# Patient Record
Sex: MALE | Race: WHITE | NOT HISPANIC OR LATINO | Employment: OTHER | URBAN - METROPOLITAN AREA
[De-identification: names, ages, dates, MRNs, and addresses within clinical notes are randomized per-mention and may not be internally consistent; named-entity substitution may affect disease eponyms.]

---

## 2018-05-30 ENCOUNTER — OFFICE VISIT CONVERTED (OUTPATIENT)
Dept: FAMILY MEDICINE CLINIC | Facility: CLINIC | Age: 73
End: 2018-05-30
Attending: FAMILY MEDICINE

## 2018-05-30 ENCOUNTER — CONVERSION ENCOUNTER (OUTPATIENT)
Dept: FAMILY MEDICINE CLINIC | Facility: CLINIC | Age: 73
End: 2018-05-30

## 2018-12-03 ENCOUNTER — OFFICE VISIT CONVERTED (OUTPATIENT)
Dept: FAMILY MEDICINE CLINIC | Facility: CLINIC | Age: 73
End: 2018-12-03
Attending: FAMILY MEDICINE

## 2019-05-30 ENCOUNTER — HOSPITAL ENCOUNTER (OUTPATIENT)
Dept: FAMILY MEDICINE CLINIC | Facility: CLINIC | Age: 74
Discharge: HOME | End: 2019-05-30
Attending: FAMILY MEDICINE

## 2019-05-30 LAB
ALBUMIN SERPL-MCNC: 4.4 G/DL (ref 3.5–5)
ALBUMIN/GLOB SERPL: 1.6 {RATIO} (ref 1.4–2.6)
ALP SERPL-CCNC: 41 U/L (ref 56–155)
ALT SERPL-CCNC: 30 U/L (ref 10–40)
ANION GAP SERPL CALC-SCNC: 21 MMOL/L (ref 8–19)
AST SERPL-CCNC: 33 U/L (ref 15–50)
BASOPHILS # BLD AUTO: 0.03 10*3/UL (ref 0–0.2)
BASOPHILS NFR BLD AUTO: 0.5 % (ref 0–3)
BILIRUB SERPL-MCNC: 0.61 MG/DL (ref 0.2–1.3)
BUN SERPL-MCNC: 19 MG/DL (ref 5–25)
BUN/CREAT SERPL: 15 {RATIO} (ref 6–20)
CALCIUM SERPL-MCNC: 9.9 MG/DL (ref 8.7–10.4)
CHLORIDE SERPL-SCNC: 100 MMOL/L (ref 99–111)
CHOLEST SERPL-MCNC: 156 MG/DL (ref 107–200)
CHOLEST/HDLC SERPL: 3.9 {RATIO} (ref 3–6)
CONV ABS IMM GRAN: 0.02 10*3/UL (ref 0–0.2)
CONV CO2: 25 MMOL/L (ref 22–32)
CONV IMMATURE GRAN: 0.3 % (ref 0–1.8)
CONV TOTAL PROTEIN: 7.1 G/DL (ref 6.3–8.2)
CREAT UR-MCNC: 1.27 MG/DL (ref 0.7–1.2)
DEPRECATED RDW RBC AUTO: 41.3 FL (ref 35.1–43.9)
EOSINOPHIL # BLD AUTO: 0.14 10*3/UL (ref 0–0.7)
EOSINOPHIL # BLD AUTO: 2.1 % (ref 0–7)
ERYTHROCYTE [DISTWIDTH] IN BLOOD BY AUTOMATED COUNT: 13 % (ref 11.6–14.4)
EST. AVERAGE GLUCOSE BLD GHB EST-MCNC: 134 MG/DL
GFR SERPLBLD BASED ON 1.73 SQ M-ARVRAT: 56 ML/MIN/{1.73_M2}
GLOBULIN UR ELPH-MCNC: 2.7 G/DL (ref 2–3.5)
GLUCOSE SERPL-MCNC: 116 MG/DL (ref 70–99)
HBA1C MFR BLD: 15 G/DL (ref 14–18)
HBA1C MFR BLD: 6.3 % (ref 3.5–5.7)
HCT VFR BLD AUTO: 44.4 % (ref 42–52)
HDLC SERPL-MCNC: 40 MG/DL (ref 40–60)
LDLC SERPL CALC-MCNC: 75 MG/DL (ref 70–100)
LYMPHOCYTES # BLD AUTO: 1.43 10*3/UL (ref 1–5)
MCH RBC QN AUTO: 29.5 PG (ref 27–31)
MCHC RBC AUTO-ENTMCNC: 33.8 G/DL (ref 33–37)
MCV RBC AUTO: 87.4 FL (ref 80–96)
MONOCYTES # BLD AUTO: 0.44 10*3/UL (ref 0.2–1.2)
MONOCYTES NFR BLD AUTO: 6.6 % (ref 3–10)
NEUTROPHILS # BLD AUTO: 4.58 10*3/UL (ref 2–8)
NEUTROPHILS NFR BLD AUTO: 69 % (ref 30–85)
NRBC CBCN: 0 % (ref 0–0.7)
OSMOLALITY SERPL CALC.SUM OF ELEC: 299 MOSM/KG (ref 273–304)
PLATELET # BLD AUTO: 189 10*3/UL (ref 130–400)
PMV BLD AUTO: 11.5 FL (ref 9.4–12.4)
POTASSIUM SERPL-SCNC: 3.3 MMOL/L (ref 3.5–5.3)
PSA SERPL-MCNC: 0.79 NG/ML (ref 0–4)
RBC # BLD AUTO: 5.08 10*6/UL (ref 4.7–6.1)
SODIUM SERPL-SCNC: 143 MMOL/L (ref 135–147)
TRIGL SERPL-MCNC: 205 MG/DL (ref 40–150)
VARIANT LYMPHS NFR BLD MANUAL: 21.5 % (ref 20–45)
VLDLC SERPL-MCNC: 41 MG/DL (ref 5–37)
WBC # BLD AUTO: 6.64 10*3/UL (ref 4.8–10.8)

## 2019-06-05 ENCOUNTER — OFFICE VISIT CONVERTED (OUTPATIENT)
Dept: FAMILY MEDICINE CLINIC | Facility: CLINIC | Age: 74
End: 2019-06-05
Attending: FAMILY MEDICINE

## 2019-06-05 ENCOUNTER — CONVERSION ENCOUNTER (OUTPATIENT)
Dept: FAMILY MEDICINE CLINIC | Facility: CLINIC | Age: 74
End: 2019-06-05

## 2019-12-02 ENCOUNTER — HOSPITAL ENCOUNTER (OUTPATIENT)
Dept: FAMILY MEDICINE CLINIC | Facility: CLINIC | Age: 74
Discharge: HOME | End: 2019-12-02
Attending: FAMILY MEDICINE

## 2019-12-02 LAB
BASOPHILS # BLD AUTO: 0.06 10*3/UL (ref 0–0.2)
BASOPHILS NFR BLD AUTO: 1 % (ref 0–3)
CONV ABS IMM GRAN: 0.01 10*3/UL (ref 0–0.2)
CONV IMMATURE GRAN: 0.2 % (ref 0–1.8)
DEPRECATED RDW RBC AUTO: 42 FL (ref 35.1–43.9)
EOSINOPHIL # BLD AUTO: 0.16 10*3/UL (ref 0–0.7)
EOSINOPHIL # BLD AUTO: 2.8 % (ref 0–7)
ERYTHROCYTE [DISTWIDTH] IN BLOOD BY AUTOMATED COUNT: 12.8 % (ref 11.6–14.4)
HCT VFR BLD AUTO: 42.3 % (ref 42–52)
HGB BLD-MCNC: 14.3 G/DL (ref 14–18)
LYMPHOCYTES # BLD AUTO: 1.49 10*3/UL (ref 1–5)
LYMPHOCYTES NFR BLD AUTO: 25.8 % (ref 20–45)
MCH RBC QN AUTO: 30.2 PG (ref 27–31)
MCHC RBC AUTO-ENTMCNC: 33.8 G/DL (ref 33–37)
MCV RBC AUTO: 89.4 FL (ref 80–96)
MONOCYTES # BLD AUTO: 0.45 10*3/UL (ref 0.2–1.2)
MONOCYTES NFR BLD AUTO: 7.8 % (ref 3–10)
NEUTROPHILS # BLD AUTO: 3.61 10*3/UL (ref 2–8)
NEUTROPHILS NFR BLD AUTO: 62.4 % (ref 30–85)
NRBC CBCN: 0 % (ref 0–0.7)
PLATELET # BLD AUTO: 184 10*3/UL (ref 130–400)
PMV BLD AUTO: 11.5 FL (ref 9.4–12.4)
RBC # BLD AUTO: 4.73 10*6/UL (ref 4.7–6.1)
WBC # BLD AUTO: 5.78 10*3/UL (ref 4.8–10.8)

## 2019-12-03 LAB
ALBUMIN SERPL-MCNC: 4.1 G/DL (ref 3.5–5)
ALBUMIN/GLOB SERPL: 1.4 {RATIO} (ref 1.4–2.6)
ALP SERPL-CCNC: 35 U/L (ref 56–155)
ALT SERPL-CCNC: 18 U/L (ref 10–40)
ANION GAP SERPL CALC-SCNC: 19 MMOL/L (ref 8–19)
AST SERPL-CCNC: 23 U/L (ref 15–50)
BILIRUB SERPL-MCNC: 0.61 MG/DL (ref 0.2–1.3)
BUN SERPL-MCNC: 22 MG/DL (ref 5–25)
BUN/CREAT SERPL: 15 {RATIO} (ref 6–20)
CALCIUM SERPL-MCNC: 9.6 MG/DL (ref 8.7–10.4)
CHLORIDE SERPL-SCNC: 101 MMOL/L (ref 99–111)
CHOLEST SERPL-MCNC: 145 MG/DL (ref 107–200)
CHOLEST/HDLC SERPL: 4 {RATIO} (ref 3–6)
CONV CO2: 27 MMOL/L (ref 22–32)
CONV TOTAL PROTEIN: 7.1 G/DL (ref 6.3–8.2)
CREAT UR-MCNC: 1.44 MG/DL (ref 0.7–1.2)
GFR SERPLBLD BASED ON 1.73 SQ M-ARVRAT: 48 ML/MIN/{1.73_M2}
GLOBULIN UR ELPH-MCNC: 3 G/DL (ref 2–3.5)
GLUCOSE SERPL-MCNC: 110 MG/DL (ref 70–99)
HDLC SERPL-MCNC: 36 MG/DL (ref 40–60)
LDLC SERPL CALC-MCNC: 70 MG/DL (ref 70–100)
OSMOLALITY SERPL CALC.SUM OF ELEC: 300 MOSM/KG (ref 273–304)
POTASSIUM SERPL-SCNC: 3.8 MMOL/L (ref 3.5–5.3)
SODIUM SERPL-SCNC: 143 MMOL/L (ref 135–147)
TRIGL SERPL-MCNC: 196 MG/DL (ref 40–150)
TSH SERPL-ACNC: 2.45 M[IU]/L (ref 0.27–4.2)
VLDLC SERPL-MCNC: 39 MG/DL (ref 5–37)

## 2019-12-05 ENCOUNTER — OFFICE VISIT CONVERTED (OUTPATIENT)
Dept: FAMILY MEDICINE CLINIC | Facility: CLINIC | Age: 74
End: 2019-12-05
Attending: FAMILY MEDICINE

## 2019-12-05 ENCOUNTER — CONVERSION ENCOUNTER (OUTPATIENT)
Dept: FAMILY MEDICINE CLINIC | Facility: CLINIC | Age: 74
End: 2019-12-05

## 2019-12-05 LAB
EST. AVERAGE GLUCOSE BLD GHB EST-MCNC: 131 MG/DL
HBA1C MFR BLD: 6.2 % (ref 3.5–5.7)

## 2020-06-04 ENCOUNTER — HOSPITAL ENCOUNTER (OUTPATIENT)
Dept: SURGERY | Facility: CLINIC | Age: 75
Discharge: HOME | End: 2020-06-04
Attending: FAMILY MEDICINE

## 2020-06-04 LAB
EST. AVERAGE GLUCOSE BLD GHB EST-MCNC: 146 MG/DL
HBA1C MFR BLD: 6.7 % (ref 3.5–5.7)

## 2020-06-08 ENCOUNTER — OFFICE VISIT CONVERTED (OUTPATIENT)
Dept: FAMILY MEDICINE CLINIC | Facility: CLINIC | Age: 75
End: 2020-06-08
Attending: FAMILY MEDICINE

## 2020-12-10 ENCOUNTER — HOSPITAL ENCOUNTER (OUTPATIENT)
Dept: FAMILY MEDICINE CLINIC | Facility: CLINIC | Age: 75
Discharge: HOME | End: 2020-12-10
Attending: FAMILY MEDICINE

## 2020-12-10 LAB
ALBUMIN SERPL-MCNC: 4.2 G/DL (ref 3.5–5)
ALBUMIN/GLOB SERPL: 1.5 {RATIO} (ref 1.4–2.6)
ALP SERPL-CCNC: 35 U/L (ref 56–155)
ALT SERPL-CCNC: 23 U/L (ref 10–40)
ANION GAP SERPL CALC-SCNC: 15 MMOL/L (ref 8–19)
AST SERPL-CCNC: 25 U/L (ref 15–50)
BASOPHILS # BLD AUTO: 0.03 10*3/UL (ref 0–0.2)
BASOPHILS NFR BLD AUTO: 0.4 % (ref 0–3)
BILIRUB SERPL-MCNC: 0.61 MG/DL (ref 0.2–1.3)
BUN SERPL-MCNC: 21 MG/DL (ref 5–25)
BUN/CREAT SERPL: 16 {RATIO} (ref 6–20)
CALCIUM SERPL-MCNC: 9.6 MG/DL (ref 8.7–10.4)
CHLORIDE SERPL-SCNC: 102 MMOL/L (ref 99–111)
CHOLEST SERPL-MCNC: 150 MG/DL (ref 107–200)
CHOLEST/HDLC SERPL: 3.7 {RATIO} (ref 3–6)
CONV ABS IMM GRAN: 0.02 10*3/UL (ref 0–0.2)
CONV CO2: 28 MMOL/L (ref 22–32)
CONV IMMATURE GRAN: 0.3 % (ref 0–1.8)
CONV TOTAL PROTEIN: 7 G/DL (ref 6.3–8.2)
CREAT UR-MCNC: 1.35 MG/DL (ref 0.7–1.2)
DEPRECATED RDW RBC AUTO: 41.5 FL (ref 35.1–43.9)
EOSINOPHIL # BLD AUTO: 0.21 10*3/UL (ref 0–0.7)
EOSINOPHIL # BLD AUTO: 2.9 % (ref 0–7)
ERYTHROCYTE [DISTWIDTH] IN BLOOD BY AUTOMATED COUNT: 12.9 % (ref 11.6–14.4)
GFR SERPLBLD BASED ON 1.73 SQ M-ARVRAT: 51 ML/MIN/{1.73_M2}
GLOBULIN UR ELPH-MCNC: 2.8 G/DL (ref 2–3.5)
GLUCOSE SERPL-MCNC: 113 MG/DL (ref 70–99)
HCT VFR BLD AUTO: 45.1 % (ref 42–52)
HDLC SERPL-MCNC: 41 MG/DL (ref 40–60)
HGB BLD-MCNC: 15.3 G/DL (ref 14–18)
LDLC SERPL CALC-MCNC: 70 MG/DL (ref 70–100)
LYMPHOCYTES # BLD AUTO: 2.16 10*3/UL (ref 1–5)
LYMPHOCYTES NFR BLD AUTO: 30.3 % (ref 20–45)
MCH RBC QN AUTO: 29.7 PG (ref 27–31)
MCHC RBC AUTO-ENTMCNC: 33.9 G/DL (ref 33–37)
MCV RBC AUTO: 87.4 FL (ref 80–96)
MONOCYTES # BLD AUTO: 0.55 10*3/UL (ref 0.2–1.2)
MONOCYTES NFR BLD AUTO: 7.7 % (ref 3–10)
NEUTROPHILS # BLD AUTO: 4.17 10*3/UL (ref 2–8)
NEUTROPHILS NFR BLD AUTO: 58.4 % (ref 30–85)
NRBC CBCN: 0 % (ref 0–0.7)
OSMOLALITY SERPL CALC.SUM OF ELEC: 298 MOSM/KG (ref 273–304)
PLATELET # BLD AUTO: 200 10*3/UL (ref 130–400)
PMV BLD AUTO: 10.9 FL (ref 9.4–12.4)
POTASSIUM SERPL-SCNC: 3.4 MMOL/L (ref 3.5–5.3)
PSA SERPL-MCNC: 1.01 NG/ML (ref 0–4)
RBC # BLD AUTO: 5.16 10*6/UL (ref 4.7–6.1)
SODIUM SERPL-SCNC: 142 MMOL/L (ref 135–147)
TRIGL SERPL-MCNC: 195 MG/DL (ref 40–150)
TSH SERPL-ACNC: 4 M[IU]/L (ref 0.27–4.2)
VLDLC SERPL-MCNC: 39 MG/DL (ref 5–37)
WBC # BLD AUTO: 7.14 10*3/UL (ref 4.8–10.8)

## 2020-12-14 ENCOUNTER — OFFICE VISIT CONVERTED (OUTPATIENT)
Dept: FAMILY MEDICINE CLINIC | Facility: CLINIC | Age: 75
End: 2020-12-14
Attending: FAMILY MEDICINE

## 2020-12-14 LAB
EST. AVERAGE GLUCOSE BLD GHB EST-MCNC: 140 MG/DL
HBA1C MFR BLD: 6.5 % (ref 3.5–5.7)

## 2021-05-13 NOTE — PROGRESS NOTES
Progress Note      Patient Name: Ben Singh   Patient ID: [id number]   Sex: Male   YOB: 1945    Primary Care Provider: Mustapha Wood III MD    Visit Date: June 8, 2020    Provider: Mustapha Wood III MD   Location: [facility]   Location Address: [address] [place] [address]  [id number]   Location Phone: [phone]          Chief Complaint  · 6 month follow up dm type II, hgb A1C 6.7      History Of Present Illness  Ben Singh is a 75 year old /White male who presents for evaluation and treatment of:      HPI     patient 75-year-old with Diabetes and Hypertension.  Patient is here for his regular 6-month check    Review of systems     cardiovascular no chest pain palpitations  Respiratory no shortness of breath dyspnea on exertion.  No diabetes patient's A1c is 6.7.  GI patient refuses colonoscopy.  Was asked today.      Physical exam     blood pressure 112/70 weight 194, temperature 97.9  General no distress patient appears in vigorous good health  Cardiovascular regular rhythm, no murmur   Respiratory lungs are clear and equal bilaterally no increased work of breathing no wheezes no rales no rhonchi    Assessment     type 2 diabetes doing well.  patient does refuse colonoscopy.      Plan     6-month recheck       Past Medical History  Disease Name Date Onset Notes   Colonoscopy refused 06/08/2020 --    Diabetes mellitus type II, controlled 06/08/2020 --    Diabetes mellitus, type 2 12/03/2018 --    Erectile Dysfunction, Unspecified --  --    Essential hypertension 12/03/2018 --    Hyperlipidemia --  --    Hypertension, essential --  --    Male sexual dysfunction 05/30/2018 --          Past Surgical History  Procedure Name Date Notes   Colonoscopy refused --    Hydrocele Repair --  --          Medication List  Name Date Started Instructions   amlodipine-benazepril 10-20 mg oral capsule 06/08/2020 take 1 capsule by oral route once daily for 90 days    Maxzide-25mg 37.5-25 mg oral tablet 06/08/2020 TAKE 1 TABLET BY ORAL ROUTE ONCE DAILY FOR 90 DAYS   metformin 500 mg oral tablet extended release 24 hr 06/08/2020 TAKE 1 TABLET(500 MG) BY MOUTH EVERY DAY WITH THE EVENING MEAL   simvastatin 20 mg oral tablet 06/08/2020 take 1 tablet (20 mg) by oral route once daily in the evening for 90 days   Viagra 100 mg oral tablet  take 1 tablet (100 mg) by oral route once daily as needed approximately 1 hour before sexual activity         Allergy List  Allergen Name Date Reaction Notes   NO KNOWN DRUG ALLERGIES --  --  --        Allergies Reconciled  Family Medical History  Disease Name Relative/Age Notes   Lung Neoplasm, Malignant Father/   --    Stroke Mother/   --    Heart Disease Father/   --    Diabetes, unspecified type Mother/   --          Social History  Finding Status Start/Stop Quantity Notes   Active but no formal exercise --  --/-- --  --    Advance Care Plan/Surrogate Decision Maker scanned into EMR --  --/-- --  --    Advance directive declined by patient --  --/-- --  --    Alcohol Never --/-- --  05/22/2017 -     --  --/-- --  --    Tobacco Never --/-- --  --          Vitals  Date Time BP Position Site L\R Cuff Size HR RR TEMP (F) WT  HT  BMI kg/m2 BSA m2 O2 Sat HC       06/08/2020 09:13 /70 Sitting      97.9 193lbs 16oz 6'   26.31 2.11               Assessment  · Screening for colon cancer     V76.51/Z12.11  · Hyperlipidemia     272.4/E78.5  · Male sexual dysfunction     302.70/N53.9  · Hypertension, essential     401.9/I10  · Diabetes mellitus type II, controlled     250.00/E11.9  · Medication management     V58.69/Z79.899  · Colonoscopy refused     V64.2/Z53.20    Problems Reconciled  Plan  · Orders  o ACO - Pt declines to or was not able to provide an Advance Care Plan or name a Surrogate Decision Maker (1124F) - - 06/08/2020  o ACO-39: Current medications updated and reviewed () - - 06/08/2020  · Medications  o amlodipine-benazepril  10-20 mg oral capsule   SIG: take 1 capsule by oral route once daily for 90 days   DISP: (90) capsules with 3 refills  Refilled on 06/08/2020     o Maxzide-25mg 37.5-25 mg oral tablet   SIG: TAKE 1 TABLET BY ORAL ROUTE ONCE DAILY FOR 90 DAYS   DISP: (90) Tablet with 3 refills  Refilled on 06/08/2020     o metformin 500 mg oral tablet extended release 24 hr   SIG: TAKE 1 TABLET(500 MG) BY MOUTH EVERY DAY WITH THE EVENING MEAL   DISP: (90) Tablet with 1 refills  Refilled on 06/08/2020     o simvastatin 20 mg oral tablet   SIG: take 1 tablet (20 mg) by oral route once daily in the evening for 90 days   DISP: (90) Tablet with 3 refills  Refilled on 06/08/2020     o Medications have been Reconciled  o Transition of Care or Provider Policy  · Instructions  o Patient declines colorectal cancer screening. Discussed risks associated with not having screening performed.   o Advised that cheeses and other sources of dairy fats, animal fats, fast food, and the extras (candy, pastries, pies, doughnuts and cookies) all contain LDL raising nutrients. Advised to increase fruits, vegetables, whole grains, and to monitor portion sizes.   o Patient is taking medications as prescribed and doing well.   o Take all medications as prescribed/directed.  o Patient instructed/educated on their diet and exercise program.  o Patient was educated/instructed on their diagnosis, treatment and medications prior to discharge from the clinic today.  o Bring all medicines with their bottles to each office visit.  o Time spent with the patient was 30 minutes, more than 50% face to face.  o Chronic conditions reviewed and taken into consideration for today's treatment plan.  o Discussed Covid-19 precautions including, but not limited to, social distancing, avoid touching your face, and hand washing.             Electronically Signed by: Darlyn Meredith, -Author on June 8, 2020 01:07:06 PM  Electronically Co-signed by: Mustapha Wood III MD -Reviewer on  June 8, 2020 05:19:11 PM

## 2021-05-13 NOTE — PROGRESS NOTES
Progress Note      Patient Name: Ben Singh   Patient ID: [id number]   Sex: Male   YOB: 1945    Primary Care Provider: Mustapha Wood III MD   Referring Provider: Mustapha Wood III MD    Visit Date: December 14, 2020    Provider: Mustapha Wood III MD   Location: [facility]   Location Address: [address] [place] [address]  [id number]   Location Phone: [phone]          Chief Complaint  · Adult General Male Physical Exam  · refill meds, fu labs      History Of Present Illness  Ben Singh is a 75 year old /White male who presents for evaluation and treatment of:      PI     patient 75-year-old history of type 2 diabetes hypertension hypercholesterolemia ED here for his annual physical and his 6-month diabetic check feeling well new    Review of systems     cardiovascular no chest pain no palpitations  Respiratory no shortness of breath no dyspnea on exertion  GI patient does not want colonoscopy.  No problems with his GI tract.  Discussed on continuing to lose weight.  Endocrinologic patient takes 1 Metformin we will review his H A1c  Musculoskeletal no pain with his knees hips shoulders it bothers him significantly.  Skin no lesions  Neuro logic no focal deficits no problems with speech clear.  TMs are negative no oral lesions    PE    Blood pressure 110/60 heart rate 68 weight 194 pulse ox 97 temperature 98.2  General no distress  HEENT sclera and conjunctiva clear TMs are negative no oral lesions  Neck no adenopathy no thyroidomegaly no bruits  Respiratory no increased work of breathing lungs clear and equal bilaterally no rales no rhonchi no wheezes  Cardiovascular regular rhythm no murmur  Abdomen soft and nontender no hepatosplenomegaly no abnormal pulsations  Genitalia testicles are normal no hernia penis is normal  Rectal 1+ prostate hypertrophy  Skin no lesions  Neurologic mentation is normal speech is normal gait is  normal  Musculoskeletal few crepitations in his knees good internal and external rotation of his hips shoulders are normal  Adenopathy no cervical axillary or inguinal adenopathy assessment type 2 diabetes hypertension controlled hypercholesterolemia controlled ED on Viagra 100    Assessment  #1continue to lose weight  #2 Hypertension controlled   #3 type 2 diabetes await the A1c    Plan recheck 1 year lose weight       Past Medical History  Disease Name Date Onset Notes   Colonoscopy refused 06/08/2020 --    Diabetes mellitus type II, controlled 06/08/2020 --    Diabetes mellitus, type 2 12/03/2018 --    Erectile Dysfunction, Unspecified --  --    Essential hypertension 12/03/2018 --    Hyperlipidemia --  --    Hypertension, essential --  --    Male sexual dysfunction 05/30/2018 --          Past Surgical History  Procedure Name Date Notes   Colonoscopy refused --    Hydrocele Repair --  --          Medication List  Name Date Started Instructions   amlodipine-benazepril 10-20 mg oral capsule 06/08/2020 take 1 capsule by oral route once daily for 90 days   Maxzide-25mg 37.5-25 mg oral tablet 06/08/2020 TAKE 1 TABLET BY ORAL ROUTE ONCE DAILY FOR 90 DAYS   metformin 500 mg oral tablet extended release 24 hr 12/14/2020 TAKE 1 TABLET(500 MG) BY MOUTH EVERY DAY WITH THE EVENING MEAL for 90 days   simvastatin 20 mg oral tablet 06/08/2020 take 1 tablet (20 mg) by oral route once daily in the evening for 90 days   Viagra 100 mg oral tablet  take 1 tablet (100 mg) by oral route once daily as needed approximately 1 hour before sexual activity         Allergy List  Allergen Name Date Reaction Notes   NO KNOWN DRUG ALLERGIES --  --  --        Allergies Reconciled  Family Medical History  Disease Name Relative/Age Notes   Lung Neoplasm, Malignant Father/   --    Stroke Mother/   --    Heart Disease Father/   --    Diabetes, unspecified type Mother/   --          Social History  Finding Status Start/Stop Quantity Notes   Active  but no formal exercise --  --/-- --  --    Advance Care Plan/Surrogate Decision Maker scanned into EMR --  --/-- --  --    Advance directive declined by patient --  --/-- --  --    Alcohol Never --/-- --  05/22/2017 -     --  --/-- --  --    Tobacco Never --/-- --  --          Vitals  Date Time BP Position Site L\R Cuff Size HR RR TEMP (F) WT  HT  BMI kg/m2 BSA m2 O2 Sat FR L/min FiO2 HC       12/14/2020 03:52 /60 Sitting    68 - R  98.2 193lbs 16oz    97 %  21%              Results  · In-Office Procedures  o Lab procedure  § IOP - Urinalysis without Microscopy (Clinitek) [facility] (21998)   § Color Ur: Yellow   § Clarity Ur: Clear   § Glucose Ur Ql Strip: Negative   § Bilirub Ur Ql Strip: Negative   § Ketones Ur Ql Strip: Negative   § Sp Gr Ur Qn: 1.020   § Hgb Ur Ql Strip: Negative   § pH Ur-LsCnc: 6.5   § Prot Ur Ql Strip: Negative   § Urobilinogen Ur Strip-mCnc: 0.2 E.U./dL   § Nitrite Ur Ql Strip: Negative   § WBC Est Ur Ql Strip: Negative       Assessment  · General Medical Exam, Adult (CPE)     V70.0/Z00.00  · Screening for depression     V79.0/Z13.89  · Colonoscopy refused     V64.2/Z53.20  · Diabetes mellitus type II, controlled     250.00/E11.9  · Male sexual dysfunction     302.70/N53.9  · Hypertension, essential     401.9/I10  · Medication management     V58.69/Z79.899      Plan  · Orders  o ACO-18: Negative screen for clinical depression using a standardized tool () - V79.0/Z13.89 - 12/15/2020  o ACO - Pt declines to or was not able to provide an Advance Care Plan or name a Surrogate Decision Maker (1124F) - - 12/15/2020  o ACO-17: Screened for tobacco use AND received tobacco cessation intervention (4004F) - - 12/15/2020  o ACO-39: Current medications updated and reviewed (0929F, ) - - 12/14/2020  o ACO-18: Negative screen for clinical depression using a standardized tool () - - 12/15/2020  · Medications  o metformin 500 mg oral tablet extended release 24 hr   SIG: TAKE 1  TABLET(500 MG) BY MOUTH EVERY DAY WITH THE EVENING MEAL for 90 days   DISP: (90) Tablet with 1 refills  Refilled on 12/14/2020     o Medications have been Reconciled  o Transition of Care or Provider Policy  · Instructions  o Depression Screen completed and scanned into the EMR under the designated folder within the patient's documents.  o Today's PHQ-9 result is _0__  o The provider screening met the required time of 15 minutes.  o Take all medications as prescribed/directed.  o Patient was educated/instructed on their diagnosis, treatment and medications prior to discharge from the clinic today.  o Call the office with any concerns or questions.  o Bring all medicines with their bottles to each office visit.  o Time spent with the patient was 40 minutes, more than 50% face to face.  o Discussed Covid-19 precautions including, but not limited to, social distancing, avoid touching your face, and hand washing.             Electronically Signed by: Shannan Diaz, -Author on December 15, 2020 06:21:32 PM  Electronically Co-signed by: Mustapha Wood III MD -Reviewer on December 16, 2020 11:46:57 AM

## 2021-05-14 VITALS
WEIGHT: 194 LBS | HEART RATE: 68 BPM | SYSTOLIC BLOOD PRESSURE: 110 MMHG | DIASTOLIC BLOOD PRESSURE: 60 MMHG | OXYGEN SATURATION: 97 % | TEMPERATURE: 98.2 F

## 2021-05-15 VITALS
WEIGHT: 197 LBS | HEIGHT: 72 IN | BODY MASS INDEX: 26.68 KG/M2 | DIASTOLIC BLOOD PRESSURE: 72 MMHG | SYSTOLIC BLOOD PRESSURE: 120 MMHG

## 2021-05-15 VITALS
HEIGHT: 72 IN | DIASTOLIC BLOOD PRESSURE: 70 MMHG | BODY MASS INDEX: 26.14 KG/M2 | SYSTOLIC BLOOD PRESSURE: 120 MMHG | WEIGHT: 193 LBS

## 2021-05-15 VITALS
BODY MASS INDEX: 26.28 KG/M2 | WEIGHT: 194 LBS | TEMPERATURE: 97.9 F | HEIGHT: 72 IN | DIASTOLIC BLOOD PRESSURE: 70 MMHG | SYSTOLIC BLOOD PRESSURE: 112 MMHG

## 2021-05-16 VITALS
HEIGHT: 72 IN | SYSTOLIC BLOOD PRESSURE: 128 MMHG | BODY MASS INDEX: 27.9 KG/M2 | WEIGHT: 206 LBS | DIASTOLIC BLOOD PRESSURE: 70 MMHG

## 2021-05-16 VITALS
SYSTOLIC BLOOD PRESSURE: 120 MMHG | HEIGHT: 72 IN | DIASTOLIC BLOOD PRESSURE: 76 MMHG | WEIGHT: 204 LBS | BODY MASS INDEX: 27.63 KG/M2

## 2021-06-10 ENCOUNTER — LAB (OUTPATIENT)
Dept: FAMILY MEDICINE CLINIC | Facility: CLINIC | Age: 76
End: 2021-06-10

## 2021-06-10 DIAGNOSIS — E11.9 TYPE 2 DIABETES MELLITUS WITHOUT COMPLICATION, WITHOUT LONG-TERM CURRENT USE OF INSULIN (HCC): Primary | ICD-10-CM

## 2021-06-10 LAB — HBA1C MFR BLD: 6.4 % (ref 4.8–5.6)

## 2021-06-10 PROCEDURE — 36415 COLL VENOUS BLD VENIPUNCTURE: CPT | Performed by: FAMILY MEDICINE

## 2021-06-10 PROCEDURE — 83036 HEMOGLOBIN GLYCOSYLATED A1C: CPT | Performed by: FAMILY MEDICINE

## 2021-06-14 PROBLEM — Z79.899 MEDICATION MANAGEMENT: Status: ACTIVE | Noted: 2021-06-14

## 2021-06-14 PROBLEM — E11.9 DIABETES MELLITUS TYPE 2, CONTROLLED: Status: ACTIVE | Noted: 2020-06-08

## 2021-06-14 PROBLEM — Z53.20 COLONOSCOPY REFUSED: Status: ACTIVE | Noted: 2020-06-08

## 2021-06-14 PROBLEM — N53.9 MALE SEXUAL DYSFUNCTION: Status: ACTIVE | Noted: 2018-05-30

## 2021-06-14 RX ORDER — SIMVASTATIN 20 MG
20 TABLET ORAL DAILY
COMMUNITY
Start: 2021-03-21 | End: 2021-06-15 | Stop reason: SDUPTHER

## 2021-06-14 RX ORDER — TRIAMTERENE AND HYDROCHLOROTHIAZIDE 37.5; 25 MG/1; MG/1
1 TABLET ORAL DAILY
COMMUNITY
Start: 2021-03-21 | End: 2021-06-15 | Stop reason: SDUPTHER

## 2021-06-14 RX ORDER — METFORMIN HYDROCHLORIDE 500 MG/1
500 TABLET, EXTENDED RELEASE ORAL DAILY
COMMUNITY
Start: 2021-05-08 | End: 2021-06-15 | Stop reason: SDUPTHER

## 2021-06-14 RX ORDER — AMLODIPINE BESYLATE AND BENAZEPRIL HYDROCHLORIDE 10; 20 MG/1; MG/1
1 CAPSULE ORAL DAILY
COMMUNITY
Start: 2021-03-21 | End: 2021-06-15 | Stop reason: SDUPTHER

## 2021-06-15 ENCOUNTER — OFFICE VISIT (OUTPATIENT)
Dept: FAMILY MEDICINE CLINIC | Facility: CLINIC | Age: 76
End: 2021-06-15

## 2021-06-15 VITALS
WEIGHT: 193 LBS | BODY MASS INDEX: 26.14 KG/M2 | OXYGEN SATURATION: 94 % | SYSTOLIC BLOOD PRESSURE: 110 MMHG | HEIGHT: 72 IN | DIASTOLIC BLOOD PRESSURE: 62 MMHG | TEMPERATURE: 97.1 F | HEART RATE: 57 BPM

## 2021-06-15 DIAGNOSIS — N53.9 MALE SEXUAL DYSFUNCTION: Primary | ICD-10-CM

## 2021-06-15 DIAGNOSIS — E11.9 CONTROLLED TYPE 2 DIABETES MELLITUS WITHOUT COMPLICATION, WITHOUT LONG-TERM CURRENT USE OF INSULIN (HCC): ICD-10-CM

## 2021-06-15 DIAGNOSIS — Z53.20 COLONOSCOPY REFUSED: ICD-10-CM

## 2021-06-15 DIAGNOSIS — I10 ESSENTIAL HYPERTENSION: ICD-10-CM

## 2021-06-15 DIAGNOSIS — Z79.899 MEDICATION MANAGEMENT: ICD-10-CM

## 2021-06-15 LAB
ALBUMIN UR-MCNC: 1.6 MG/DL
CREAT UR-MCNC: 177.5 MG/DL
MICROALBUMIN/CREAT UR: 9 MG/G

## 2021-06-15 PROCEDURE — 82043 UR ALBUMIN QUANTITATIVE: CPT | Performed by: FAMILY MEDICINE

## 2021-06-15 PROCEDURE — 99213 OFFICE O/P EST LOW 20 MIN: CPT | Performed by: FAMILY MEDICINE

## 2021-06-15 PROCEDURE — 82570 ASSAY OF URINE CREATININE: CPT | Performed by: FAMILY MEDICINE

## 2021-06-15 RX ORDER — METFORMIN HYDROCHLORIDE 500 MG/1
500 TABLET, EXTENDED RELEASE ORAL DAILY
Qty: 90 TABLET | Refills: 1 | Status: SHIPPED | OUTPATIENT
Start: 2021-06-15 | End: 2022-02-10

## 2021-06-15 RX ORDER — TRIAMTERENE AND HYDROCHLOROTHIAZIDE 37.5; 25 MG/1; MG/1
1 TABLET ORAL DAILY
Qty: 90 TABLET | Refills: 3 | Status: SHIPPED | OUTPATIENT
Start: 2021-06-15 | End: 2021-06-25

## 2021-06-15 RX ORDER — SIMVASTATIN 20 MG
20 TABLET ORAL DAILY
Qty: 90 TABLET | Refills: 3 | Status: SHIPPED | OUTPATIENT
Start: 2021-06-15 | End: 2022-06-16 | Stop reason: SDUPTHER

## 2021-06-15 RX ORDER — AMLODIPINE BESYLATE AND BENAZEPRIL HYDROCHLORIDE 10; 20 MG/1; MG/1
1 CAPSULE ORAL DAILY
Qty: 90 CAPSULE | Refills: 3 | Status: SHIPPED | OUTPATIENT
Start: 2021-06-15 | End: 2022-06-16 | Stop reason: SDUPTHER

## 2021-06-15 NOTE — PROGRESS NOTES
"Chief Complaint  Diabetes and Med Management    Subjective          Ben Singh presents to [facility]  History of Present Illness  Patient 76 years old history of essential hypertension type 2 diabetes here for his 6-month recheck doing well not having any problems    No Known Allergies     Past Surgical History:   • HYDROCELE EXCISION / REPAIR       Social History     Tobacco Use   • Smoking status: Never Smoker   • Smokeless tobacco: Never Used   Substance Use Topics   • Alcohol use: Never       Family History   Problem Relation Age of Onset   • Stroke Mother    • Diabetes Mother    • Heart disease Father    • Lung disease Father         Health Maintenance Due   Topic Date Due   • URINE MICROALBUMIN  Never done   • COVID-19 Vaccine (1) Never done   • TDAP/TD VACCINES (1 - Tdap) Never done   • ZOSTER VACCINE (1 of 2) Never done   • Pneumococcal Vaccine 65+ (1 of 1 - PPSV23) Never done   • HEPATITIS C SCREENING  Never done   • ANNUAL WELLNESS VISIT  06/09/2021   • DIABETIC FOOT EXAM  Never done      Last Completed Colonoscopy     This patient has no relevant Health Maintenance data.          Review of Systems   Cardiovascular no chest pain no palpitations  Respiratory no shortness of breath no dyspnea on exertion  GI patient refuses colonoscopy also refuses coronavirus vaccine  Objective     Vitals:    06/15/21 1058   BP: 110/62   Pulse: 57   Temp: 97.1 °F (36.2 °C)   SpO2: 94%   Weight: 87.5 kg (193 lb)   Height: 182.9 cm (72\")     Body mass index is 26.18 kg/m².      Physical Exam  General no distress  Cardiovascular regular rhythm no murmur  Respiratory lungs clear and equal bilaterally no rales no rhonchi no wheezes  Skin no lesions  Result Review :              Assessment and Plan    Essential hypertension controlled #2 type 2 diabetes A1c 6.4 good control recheck in 6 months                Patient was given instructions and counseling regarding his condition or for health " maintenance advice. Please see specific information pulled into the AVS if appropriate.

## 2021-06-25 RX ORDER — TRIAMTERENE AND HYDROCHLOROTHIAZIDE 37.5; 25 MG/1; MG/1
TABLET ORAL
Qty: 90 TABLET | Refills: 3 | Status: SHIPPED | OUTPATIENT
Start: 2021-06-25 | End: 2022-06-16 | Stop reason: SDUPTHER

## 2021-12-13 ENCOUNTER — LAB (OUTPATIENT)
Dept: FAMILY MEDICINE CLINIC | Facility: CLINIC | Age: 76
End: 2021-12-13

## 2021-12-13 DIAGNOSIS — E11.9 TYPE 2 DIABETES MELLITUS WITHOUT COMPLICATION, WITHOUT LONG-TERM CURRENT USE OF INSULIN (HCC): ICD-10-CM

## 2021-12-13 DIAGNOSIS — Z11.59 NEED FOR HEPATITIS C SCREENING TEST: ICD-10-CM

## 2021-12-13 DIAGNOSIS — Z79.899 MEDICATION MANAGEMENT: ICD-10-CM

## 2021-12-13 DIAGNOSIS — I10 ESSENTIAL HYPERTENSION: ICD-10-CM

## 2021-12-13 DIAGNOSIS — Z00.00 ROUTINE MEDICAL EXAM: Primary | ICD-10-CM

## 2021-12-13 DIAGNOSIS — E78.2 MIXED HYPERLIPIDEMIA: ICD-10-CM

## 2021-12-13 DIAGNOSIS — Z00.00 ROUTINE MEDICAL EXAM: ICD-10-CM

## 2021-12-13 LAB
ALBUMIN SERPL-MCNC: 3.9 G/DL (ref 3.5–5.2)
ALBUMIN/GLOB SERPL: 1.1 G/DL
ALP SERPL-CCNC: 51 U/L (ref 39–117)
ALT SERPL W P-5'-P-CCNC: 41 U/L (ref 1–41)
ANION GAP SERPL CALCULATED.3IONS-SCNC: 10.7 MMOL/L (ref 5–15)
AST SERPL-CCNC: 40 U/L (ref 1–40)
BASOPHILS # BLD AUTO: 0.05 10*3/MM3 (ref 0–0.2)
BASOPHILS NFR BLD AUTO: 0.8 % (ref 0–1.5)
BILIRUB SERPL-MCNC: 0.6 MG/DL (ref 0–1.2)
BUN SERPL-MCNC: 32 MG/DL (ref 8–23)
BUN/CREAT SERPL: 19.6 (ref 7–25)
CALCIUM SPEC-SCNC: 10.3 MG/DL (ref 8.6–10.5)
CHLORIDE SERPL-SCNC: 103 MMOL/L (ref 98–107)
CHOLEST SERPL-MCNC: 126 MG/DL (ref 0–200)
CO2 SERPL-SCNC: 28.3 MMOL/L (ref 22–29)
CREAT SERPL-MCNC: 1.63 MG/DL (ref 0.76–1.27)
DEPRECATED RDW RBC AUTO: 41.1 FL (ref 37–54)
EOSINOPHIL # BLD AUTO: 0.14 10*3/MM3 (ref 0–0.4)
EOSINOPHIL NFR BLD AUTO: 2.2 % (ref 0.3–6.2)
ERYTHROCYTE [DISTWIDTH] IN BLOOD BY AUTOMATED COUNT: 13 % (ref 12.3–15.4)
GFR SERPL CREATININE-BSD FRML MDRD: 41 ML/MIN/1.73
GLOBULIN UR ELPH-MCNC: 3.5 GM/DL
GLUCOSE SERPL-MCNC: 103 MG/DL (ref 65–99)
HBA1C MFR BLD: 6.93 % (ref 4.8–5.6)
HCT VFR BLD AUTO: 44 % (ref 37.5–51)
HCV AB SER DONR QL: NORMAL
HDLC SERPL-MCNC: 29 MG/DL (ref 40–60)
HGB BLD-MCNC: 14.9 G/DL (ref 13–17.7)
IMM GRANULOCYTES # BLD AUTO: 0.02 10*3/MM3 (ref 0–0.05)
IMM GRANULOCYTES NFR BLD AUTO: 0.3 % (ref 0–0.5)
LDLC SERPL CALC-MCNC: 56 MG/DL (ref 0–100)
LDLC/HDLC SERPL: 1.56 {RATIO}
LYMPHOCYTES # BLD AUTO: 1.42 10*3/MM3 (ref 0.7–3.1)
LYMPHOCYTES NFR BLD AUTO: 22.2 % (ref 19.6–45.3)
MCH RBC QN AUTO: 29.7 PG (ref 26.6–33)
MCHC RBC AUTO-ENTMCNC: 33.9 G/DL (ref 31.5–35.7)
MCV RBC AUTO: 87.8 FL (ref 79–97)
MONOCYTES # BLD AUTO: 0.54 10*3/MM3 (ref 0.1–0.9)
MONOCYTES NFR BLD AUTO: 8.5 % (ref 5–12)
NEUTROPHILS NFR BLD AUTO: 4.22 10*3/MM3 (ref 1.7–7)
NEUTROPHILS NFR BLD AUTO: 66 % (ref 42.7–76)
NRBC BLD AUTO-RTO: 0 /100 WBC (ref 0–0.2)
PLATELET # BLD AUTO: 354 10*3/MM3 (ref 140–450)
PMV BLD AUTO: 10.5 FL (ref 6–12)
POTASSIUM SERPL-SCNC: 4.2 MMOL/L (ref 3.5–5.2)
PROT SERPL-MCNC: 7.4 G/DL (ref 6–8.5)
RBC # BLD AUTO: 5.01 10*6/MM3 (ref 4.14–5.8)
SODIUM SERPL-SCNC: 142 MMOL/L (ref 136–145)
TRIGL SERPL-MCNC: 259 MG/DL (ref 0–150)
TSH SERPL DL<=0.05 MIU/L-ACNC: 1.74 UIU/ML (ref 0.27–4.2)
VLDLC SERPL-MCNC: 41 MG/DL (ref 5–40)
WBC NRBC COR # BLD: 6.39 10*3/MM3 (ref 3.4–10.8)

## 2021-12-13 PROCEDURE — 80061 LIPID PANEL: CPT | Performed by: FAMILY MEDICINE

## 2021-12-13 PROCEDURE — 83036 HEMOGLOBIN GLYCOSYLATED A1C: CPT | Performed by: FAMILY MEDICINE

## 2021-12-13 PROCEDURE — 80053 COMPREHEN METABOLIC PANEL: CPT | Performed by: FAMILY MEDICINE

## 2021-12-13 PROCEDURE — 86803 HEPATITIS C AB TEST: CPT | Performed by: FAMILY MEDICINE

## 2021-12-13 PROCEDURE — 84443 ASSAY THYROID STIM HORMONE: CPT | Performed by: FAMILY MEDICINE

## 2021-12-13 PROCEDURE — 85025 COMPLETE CBC W/AUTO DIFF WBC: CPT | Performed by: FAMILY MEDICINE

## 2021-12-13 PROCEDURE — 36415 COLL VENOUS BLD VENIPUNCTURE: CPT | Performed by: FAMILY MEDICINE

## 2021-12-16 ENCOUNTER — OFFICE VISIT (OUTPATIENT)
Dept: FAMILY MEDICINE CLINIC | Facility: CLINIC | Age: 76
End: 2021-12-16

## 2021-12-16 VITALS
WEIGHT: 182 LBS | HEART RATE: 80 BPM | BODY MASS INDEX: 24.65 KG/M2 | DIASTOLIC BLOOD PRESSURE: 62 MMHG | HEIGHT: 72 IN | TEMPERATURE: 97.3 F | SYSTOLIC BLOOD PRESSURE: 90 MMHG | OXYGEN SATURATION: 94 %

## 2021-12-16 DIAGNOSIS — E78.2 MIXED HYPERLIPIDEMIA: ICD-10-CM

## 2021-12-16 DIAGNOSIS — E11.9 CONTROLLED TYPE 2 DIABETES MELLITUS WITHOUT COMPLICATION, WITHOUT LONG-TERM CURRENT USE OF INSULIN (HCC): ICD-10-CM

## 2021-12-16 DIAGNOSIS — Z00.00 ROUTINE ADULT HEALTH MAINTENANCE: Primary | ICD-10-CM

## 2021-12-16 DIAGNOSIS — Z79.899 MEDICATION MANAGEMENT: ICD-10-CM

## 2021-12-16 DIAGNOSIS — I10 ESSENTIAL HYPERTENSION: ICD-10-CM

## 2021-12-16 LAB
BILIRUB BLD-MCNC: NEGATIVE MG/DL
CLARITY, POC: CLEAR
COLOR UR: YELLOW
EXPIRATION DATE: NORMAL
GLUCOSE UR STRIP-MCNC: NEGATIVE MG/DL
KETONES UR QL: NEGATIVE
LEUKOCYTE EST, POC: NEGATIVE
Lab: NORMAL
NITRITE UR-MCNC: NEGATIVE MG/ML
PH UR: 6 [PH] (ref 5–8)
PROT UR STRIP-MCNC: NEGATIVE MG/DL
RBC # UR STRIP: NEGATIVE /UL
SP GR UR: 1.02 (ref 1–1.03)
UROBILINOGEN UR QL: NORMAL

## 2021-12-16 PROCEDURE — 99213 OFFICE O/P EST LOW 20 MIN: CPT | Performed by: FAMILY MEDICINE

## 2021-12-16 PROCEDURE — 81003 URINALYSIS AUTO W/O SCOPE: CPT | Performed by: FAMILY MEDICINE

## 2021-12-16 NOTE — PROGRESS NOTES
"Chief Complaint  Med Management (yearly routine check up) and Diabetes (6 month follow up)    SUBJECTIVE  Ben Singh presents to [facility]    76-year-old type 2 diabetes hypertension hypercholesterolemia 6-month follow-up for his diabetes    PAST MEDICAL HISTORY  No Known Allergies     Past Surgical History:   • HYDROCELE EXCISION / REPAIR       Social History     Tobacco Use   • Smoking status: Never Smoker   • Smokeless tobacco: Never Used   Substance Use Topics   • Alcohol use: Never       Family History   Problem Relation Age of Onset   • Stroke Mother    • Diabetes Mother    • Heart disease Father    • Lung disease Father         Health Maintenance Due   Topic Date Due   • COVID-19 Vaccine (1) Never done   • Pneumococcal Vaccine 65+ (1 of 2 - PPSV23) Never done   • TDAP/TD VACCINES (1 - Tdap) Never done   • ZOSTER VACCINE (1 of 2) Never done   • ANNUAL WELLNESS VISIT  06/09/2021   • DIABETIC EYE EXAM  11/23/2021        Last Completed Colonoscopy          COLORECTAL CANCER SCREENING (COLONOSCOPY - Every 10 Years) Next due on 6/14/2031 06/14/2021  COLONOSCOPY (Declined - pt refused)                REVIEW OF SYSTEMS    Cardiovascular chest pain no palpitation  Respiratory encourage patient to get both the flu shot and the coronavirus vaccine patient says he may do this no dyspnea on exertion or shortness of breath  GI patient is on colonoscopy    OBJECTIVE  Vitals:    12/16/21 0939   BP: 90/62   Pulse: 80   Temp: 97.3 °F (36.3 °C)   SpO2: 94%   Weight: 82.6 kg (182 lb)   Height: 182.9 cm (72\")     Body mass index is 24.68 kg/m².    PHYSICAL EXAM    General no distress  Cardiovascular regular rhythm no murmur  Respiratory lungs clear bilaterally  Abdomen soft nontender no hepatosplenomegaly    ASSESSMENT & PLAN        Type 2 diabetes needs an A1c hypertension well controlled            Patient was given instructions and counseling regarding his condition or for health " maintenance advice. Please see specific information pulled into the AVS if appropriate.

## 2022-02-10 RX ORDER — METFORMIN HYDROCHLORIDE 500 MG/1
TABLET, EXTENDED RELEASE ORAL
Qty: 90 TABLET | Refills: 1 | Status: SHIPPED | OUTPATIENT
Start: 2022-02-10 | End: 2022-06-16 | Stop reason: SDUPTHER

## 2022-06-13 ENCOUNTER — CLINICAL SUPPORT (OUTPATIENT)
Dept: FAMILY MEDICINE CLINIC | Facility: CLINIC | Age: 77
End: 2022-06-13

## 2022-06-13 DIAGNOSIS — E11.9 CONTROLLED TYPE 2 DIABETES MELLITUS WITHOUT COMPLICATION, WITHOUT LONG-TERM CURRENT USE OF INSULIN: Primary | ICD-10-CM

## 2022-06-13 LAB — HBA1C MFR BLD: 6.7 % (ref 4.8–5.6)

## 2022-06-13 PROCEDURE — 36415 COLL VENOUS BLD VENIPUNCTURE: CPT | Performed by: FAMILY MEDICINE

## 2022-06-13 PROCEDURE — 83036 HEMOGLOBIN GLYCOSYLATED A1C: CPT | Performed by: FAMILY MEDICINE

## 2022-06-16 ENCOUNTER — OFFICE VISIT (OUTPATIENT)
Dept: FAMILY MEDICINE CLINIC | Facility: CLINIC | Age: 77
End: 2022-06-16

## 2022-06-16 VITALS
WEIGHT: 189 LBS | DIASTOLIC BLOOD PRESSURE: 68 MMHG | TEMPERATURE: 97.9 F | OXYGEN SATURATION: 98 % | SYSTOLIC BLOOD PRESSURE: 130 MMHG | BODY MASS INDEX: 25.6 KG/M2 | HEIGHT: 72 IN | HEART RATE: 77 BPM

## 2022-06-16 DIAGNOSIS — E11.9 CONTROLLED TYPE 2 DIABETES MELLITUS WITHOUT COMPLICATION, WITHOUT LONG-TERM CURRENT USE OF INSULIN: Primary | ICD-10-CM

## 2022-06-16 DIAGNOSIS — Z79.899 MEDICATION MANAGEMENT: ICD-10-CM

## 2022-06-16 DIAGNOSIS — E78.2 MIXED HYPERLIPIDEMIA: ICD-10-CM

## 2022-06-16 DIAGNOSIS — I10 ESSENTIAL HYPERTENSION: ICD-10-CM

## 2022-06-16 LAB
ALBUMIN UR-MCNC: <1.2 MG/DL
CREAT UR-MCNC: 99.7 MG/DL
MICROALBUMIN/CREAT UR: NORMAL MG/G{CREAT}

## 2022-06-16 PROCEDURE — 99213 OFFICE O/P EST LOW 20 MIN: CPT | Performed by: FAMILY MEDICINE

## 2022-06-16 PROCEDURE — 82043 UR ALBUMIN QUANTITATIVE: CPT | Performed by: FAMILY MEDICINE

## 2022-06-16 PROCEDURE — 82570 ASSAY OF URINE CREATININE: CPT | Performed by: FAMILY MEDICINE

## 2022-06-16 RX ORDER — SIMVASTATIN 20 MG
20 TABLET ORAL DAILY
Qty: 90 TABLET | Refills: 3 | Status: SHIPPED | OUTPATIENT
Start: 2022-06-16

## 2022-06-16 RX ORDER — AMLODIPINE BESYLATE AND BENAZEPRIL HYDROCHLORIDE 10; 20 MG/1; MG/1
1 CAPSULE ORAL DAILY
Qty: 90 CAPSULE | Refills: 3 | Status: SHIPPED | OUTPATIENT
Start: 2022-06-16

## 2022-06-16 RX ORDER — METFORMIN HYDROCHLORIDE 500 MG/1
500 TABLET, EXTENDED RELEASE ORAL DAILY
Qty: 90 TABLET | Refills: 1 | Status: SHIPPED | OUTPATIENT
Start: 2022-06-16 | End: 2023-01-25 | Stop reason: SDUPTHER

## 2022-06-16 RX ORDER — TRIAMTERENE AND HYDROCHLOROTHIAZIDE 37.5; 25 MG/1; MG/1
1 TABLET ORAL DAILY
Qty: 90 TABLET | Refills: 3 | Status: SHIPPED | OUTPATIENT
Start: 2022-06-16

## 2022-06-16 NOTE — PROGRESS NOTES
"Chief Complaint  Diabetes (6 month follow up and a1c check)    SUBJECTIVE  Bne Singh presents to [facility]    Patient is a 77 years old type 2 diabetesHypertension for 6-month checkup his eyes checked regularly no problems with his feet    PAST MEDICAL HISTORY  No Known Allergies     Past Surgical History:   • HYDROCELE EXCISION / REPAIR       Social History     Tobacco Use   • Smoking status: Never Smoker   • Smokeless tobacco: Never Used   Substance Use Topics   • Alcohol use: Never       Family History   Problem Relation Age of Onset   • Stroke Mother    • Diabetes Mother    • Heart disease Father    • Lung disease Father         Health Maintenance Due   Topic Date Due   • Pneumococcal Vaccine 65+ (1 - PCV) Never done   • TDAP/TD VACCINES (1 - Tdap) Never done   • ZOSTER VACCINE (1 of 2) Never done   • ANNUAL WELLNESS VISIT  06/09/2021   • URINE MICROALBUMIN  06/15/2022        Last Completed Colonoscopy     This patient has no relevant Health Maintenance data.          REVIEW OF SYSTEMS    Cardiovascular no chest pain no palpitations  Respiratory no shortness of breath dyspnea on exertion  Endo A1c done yesterday was 6.7 good control    OBJECTIVE  Vitals:    06/16/22 1008   BP: 130/68   Pulse: 77   Temp: 97.9 °F (36.6 °C)   SpO2: 98%   Weight: 85.7 kg (189 lb)   Height: 182.9 cm (72\")     Body mass index is 25.63 kg/m².    PHYSICAL EXAM    General no distress  Cardiovascular regular rhythm no murmur  Respiratory lungs clear and equal bilaterally    ASSESSMENT & PLAN  Diagnoses and all orders for this visit:    1. Controlled type 2 diabetes mellitus without complication, without long-term current use of insulin (HCC) (Primary)  -     Microalbumin / Creatinine Urine Ratio - Urine, Clean Catch  -     metFORMIN ER (GLUCOPHAGE-XR) 500 MG 24 hr tablet; Take 1 tablet by mouth Daily.  Dispense: 90 tablet; Refill: 1    2. Medication management  -     Microalbumin / Creatinine Urine " Ratio - Urine, Clean Catch    3. Essential hypertension  -     amLODIPine-benazepril (LOTREL) 10-20 MG per capsule; Take 1 capsule by mouth Daily.  Dispense: 90 capsule; Refill: 3  -     triamterene-hydrochlorothiazide (MAXZIDE-25) 37.5-25 MG per tablet; Take 1 tablet by mouth Daily.  Dispense: 90 tablet; Refill: 3    4. Mixed hyperlipidemia  -     simvastatin (ZOCOR) 20 MG tablet; Take 1 tablet by mouth Daily.  Dispense: 90 tablet; Refill: 3          Type 2 diabetes good control A1c 6.7 hypertension good control continue Maxide and Lotrel            Patient was given instructions and counseling regarding his condition or for health maintenance advice. Please see specific information pulled into the AVS if appropriate.

## 2023-01-18 ENCOUNTER — TELEPHONE (OUTPATIENT)
Dept: FAMILY MEDICINE CLINIC | Facility: CLINIC | Age: 78
End: 2023-01-18
Payer: MEDICARE

## 2023-01-18 DIAGNOSIS — I10 ESSENTIAL HYPERTENSION: ICD-10-CM

## 2023-01-18 DIAGNOSIS — Z79.899 MEDICATION MANAGEMENT: ICD-10-CM

## 2023-01-18 DIAGNOSIS — E11.9 TYPE 2 DIABETES MELLITUS WITHOUT COMPLICATION, WITHOUT LONG-TERM CURRENT USE OF INSULIN: ICD-10-CM

## 2023-01-18 DIAGNOSIS — E78.2 MIXED HYPERLIPIDEMIA: ICD-10-CM

## 2023-01-18 DIAGNOSIS — Z00.00 LABORATORY EXAM ORDERED AS PART OF ROUTINE GENERAL MEDICAL EXAMINATION: Primary | ICD-10-CM

## 2023-01-18 NOTE — TELEPHONE ENCOUNTER
Caller: Ben Singh    Relationship: Self    Best call back number: [phone]    What orders are you requesting (i.e. lab or imaging): LABS    In what timeframe would the patient need to come in: AS SOON AS POSSIBLE, BUT BEFORE HIS APPOINTMENT ON 1.25.23. PATIENT WOULD LIKE TO COME IN ON 1.23.23.    Where will you receive your lab/imaging services: [facility]

## 2023-01-23 ENCOUNTER — CLINICAL SUPPORT (OUTPATIENT)
Dept: FAMILY MEDICINE CLINIC | Facility: CLINIC | Age: 78
End: 2023-01-23
Payer: MEDICARE

## 2023-01-23 DIAGNOSIS — I10 ESSENTIAL HYPERTENSION: ICD-10-CM

## 2023-01-23 DIAGNOSIS — Z79.899 MEDICATION MANAGEMENT: ICD-10-CM

## 2023-01-23 DIAGNOSIS — E78.2 MIXED HYPERLIPIDEMIA: ICD-10-CM

## 2023-01-23 DIAGNOSIS — E11.9 TYPE 2 DIABETES MELLITUS WITHOUT COMPLICATION, WITHOUT LONG-TERM CURRENT USE OF INSULIN: ICD-10-CM

## 2023-01-23 DIAGNOSIS — Z00.00 LABORATORY EXAM ORDERED AS PART OF ROUTINE GENERAL MEDICAL EXAMINATION: ICD-10-CM

## 2023-01-23 LAB
ALBUMIN SERPL-MCNC: 4 G/DL (ref 3.5–5.2)
ALBUMIN/GLOB SERPL: 1.4 G/DL
ALP SERPL-CCNC: 38 U/L (ref 39–117)
ALT SERPL W P-5'-P-CCNC: 19 U/L (ref 1–41)
ANION GAP SERPL CALCULATED.3IONS-SCNC: 9 MMOL/L (ref 5–15)
AST SERPL-CCNC: 18 U/L (ref 1–40)
BASOPHILS # BLD AUTO: 0.05 10*3/MM3 (ref 0–0.2)
BASOPHILS NFR BLD AUTO: 0.7 % (ref 0–1.5)
BILIRUB SERPL-MCNC: 0.6 MG/DL (ref 0–1.2)
BUN SERPL-MCNC: 19 MG/DL (ref 8–23)
BUN/CREAT SERPL: 13 (ref 7–25)
CALCIUM SPEC-SCNC: 9.7 MG/DL (ref 8.6–10.5)
CHLORIDE SERPL-SCNC: 103 MMOL/L (ref 98–107)
CHOLEST SERPL-MCNC: 143 MG/DL (ref 0–200)
CO2 SERPL-SCNC: 31 MMOL/L (ref 22–29)
CREAT SERPL-MCNC: 1.46 MG/DL (ref 0.76–1.27)
DEPRECATED RDW RBC AUTO: 41.5 FL (ref 37–54)
EGFRCR SERPLBLD CKD-EPI 2021: 49.5 ML/MIN/1.73
EOSINOPHIL # BLD AUTO: 0.26 10*3/MM3 (ref 0–0.4)
EOSINOPHIL NFR BLD AUTO: 3.4 % (ref 0.3–6.2)
ERYTHROCYTE [DISTWIDTH] IN BLOOD BY AUTOMATED COUNT: 12.9 % (ref 12.3–15.4)
GLOBULIN UR ELPH-MCNC: 2.9 GM/DL
GLUCOSE SERPL-MCNC: 121 MG/DL (ref 65–99)
HBA1C MFR BLD: 6.5 % (ref 4.8–5.6)
HCT VFR BLD AUTO: 42.3 % (ref 37.5–51)
HDLC SERPL-MCNC: 39 MG/DL (ref 40–60)
HGB BLD-MCNC: 14.5 G/DL (ref 13–17.7)
IMM GRANULOCYTES # BLD AUTO: 0.02 10*3/MM3 (ref 0–0.05)
IMM GRANULOCYTES NFR BLD AUTO: 0.3 % (ref 0–0.5)
LDLC SERPL CALC-MCNC: 73 MG/DL (ref 0–100)
LDLC/HDLC SERPL: 1.71 {RATIO}
LYMPHOCYTES # BLD AUTO: 2.14 10*3/MM3 (ref 0.7–3.1)
LYMPHOCYTES NFR BLD AUTO: 28.1 % (ref 19.6–45.3)
MCH RBC QN AUTO: 30.3 PG (ref 26.6–33)
MCHC RBC AUTO-ENTMCNC: 34.3 G/DL (ref 31.5–35.7)
MCV RBC AUTO: 88.5 FL (ref 79–97)
MONOCYTES # BLD AUTO: 0.52 10*3/MM3 (ref 0.1–0.9)
MONOCYTES NFR BLD AUTO: 6.8 % (ref 5–12)
NEUTROPHILS NFR BLD AUTO: 4.62 10*3/MM3 (ref 1.7–7)
NEUTROPHILS NFR BLD AUTO: 60.7 % (ref 42.7–76)
NRBC BLD AUTO-RTO: 0 /100 WBC (ref 0–0.2)
PLATELET # BLD AUTO: 190 10*3/MM3 (ref 140–450)
PMV BLD AUTO: 11.5 FL (ref 6–12)
POTASSIUM SERPL-SCNC: 3.7 MMOL/L (ref 3.5–5.2)
PROT SERPL-MCNC: 6.9 G/DL (ref 6–8.5)
RBC # BLD AUTO: 4.78 10*6/MM3 (ref 4.14–5.8)
SODIUM SERPL-SCNC: 143 MMOL/L (ref 136–145)
TRIGL SERPL-MCNC: 186 MG/DL (ref 0–150)
TSH SERPL DL<=0.05 MIU/L-ACNC: 4.08 UIU/ML (ref 0.27–4.2)
VLDLC SERPL-MCNC: 31 MG/DL (ref 5–40)
WBC NRBC COR # BLD: 7.61 10*3/MM3 (ref 3.4–10.8)

## 2023-01-23 PROCEDURE — 36415 COLL VENOUS BLD VENIPUNCTURE: CPT | Performed by: FAMILY MEDICINE

## 2023-01-23 PROCEDURE — 84443 ASSAY THYROID STIM HORMONE: CPT | Performed by: FAMILY MEDICINE

## 2023-01-23 PROCEDURE — 80053 COMPREHEN METABOLIC PANEL: CPT | Performed by: FAMILY MEDICINE

## 2023-01-23 PROCEDURE — 83036 HEMOGLOBIN GLYCOSYLATED A1C: CPT | Performed by: FAMILY MEDICINE

## 2023-01-23 PROCEDURE — 85025 COMPLETE CBC W/AUTO DIFF WBC: CPT | Performed by: FAMILY MEDICINE

## 2023-01-23 PROCEDURE — 84550 ASSAY OF BLOOD/URIC ACID: CPT | Performed by: FAMILY MEDICINE

## 2023-01-23 PROCEDURE — 80061 LIPID PANEL: CPT | Performed by: FAMILY MEDICINE

## 2023-01-25 ENCOUNTER — OFFICE VISIT (OUTPATIENT)
Dept: FAMILY MEDICINE CLINIC | Facility: CLINIC | Age: 78
End: 2023-01-25
Payer: MEDICARE

## 2023-01-25 VITALS
TEMPERATURE: 97.1 F | HEART RATE: 63 BPM | DIASTOLIC BLOOD PRESSURE: 62 MMHG | BODY MASS INDEX: 25.47 KG/M2 | HEIGHT: 72 IN | SYSTOLIC BLOOD PRESSURE: 132 MMHG | OXYGEN SATURATION: 95 % | WEIGHT: 188 LBS

## 2023-01-25 DIAGNOSIS — E11.9 CONTROLLED TYPE 2 DIABETES MELLITUS WITHOUT COMPLICATION, WITHOUT LONG-TERM CURRENT USE OF INSULIN: ICD-10-CM

## 2023-01-25 DIAGNOSIS — N53.9 MALE SEXUAL DYSFUNCTION: ICD-10-CM

## 2023-01-25 DIAGNOSIS — Z00.00 MEDICARE ANNUAL WELLNESS VISIT, SUBSEQUENT: Primary | ICD-10-CM

## 2023-01-25 DIAGNOSIS — E78.2 MIXED HYPERLIPIDEMIA: ICD-10-CM

## 2023-01-25 DIAGNOSIS — M67.442 GANGLION CYST OF JOINT OF FINGER OF LEFT HAND: ICD-10-CM

## 2023-01-25 DIAGNOSIS — M10.9 GOUT OF LEFT HAND, UNSPECIFIED CAUSE, UNSPECIFIED CHRONICITY: ICD-10-CM

## 2023-01-25 DIAGNOSIS — Z79.899 MEDICATION MANAGEMENT: ICD-10-CM

## 2023-01-25 DIAGNOSIS — I10 ESSENTIAL HYPERTENSION: ICD-10-CM

## 2023-01-25 DIAGNOSIS — M25.442 SWELLING OF FINGER JOINT OF LEFT HAND: ICD-10-CM

## 2023-01-25 DIAGNOSIS — Z12.11 SCREENING FOR COLON CANCER: ICD-10-CM

## 2023-01-25 DIAGNOSIS — Z53.20 COLONOSCOPY REFUSED: ICD-10-CM

## 2023-01-25 LAB — URATE SERPL-MCNC: 7.6 MG/DL (ref 3.4–7)

## 2023-01-25 PROCEDURE — 1170F FXNL STATUS ASSESSED: CPT | Performed by: FAMILY MEDICINE

## 2023-01-25 PROCEDURE — G0439 PPPS, SUBSEQ VISIT: HCPCS | Performed by: FAMILY MEDICINE

## 2023-01-25 PROCEDURE — 20600 DRAIN/INJ JOINT/BURSA W/O US: CPT | Performed by: FAMILY MEDICINE

## 2023-01-25 PROCEDURE — 82274 ASSAY TEST FOR BLOOD FECAL: CPT | Performed by: FAMILY MEDICINE

## 2023-01-25 PROCEDURE — 1159F MED LIST DOCD IN RCRD: CPT | Performed by: FAMILY MEDICINE

## 2023-01-25 RX ORDER — METFORMIN HYDROCHLORIDE 500 MG/1
500 TABLET, EXTENDED RELEASE ORAL DAILY
Qty: 90 TABLET | Refills: 1 | Status: SHIPPED | OUTPATIENT
Start: 2023-01-25

## 2023-01-25 RX ORDER — ALLOPURINOL 100 MG/1
TABLET ORAL
Qty: 90 TABLET | Refills: 0 | Status: SHIPPED | OUTPATIENT
Start: 2023-01-25 | End: 2023-03-26

## 2023-01-25 RX ORDER — ALLOPURINOL 300 MG/1
300 TABLET ORAL DAILY
Qty: 90 TABLET | Refills: 3 | Status: SHIPPED | OUTPATIENT
Start: 2023-01-25

## 2023-01-25 RX ADMIN — METHYLPREDNISOLONE ACETATE 10 MG: 40 INJECTION, SUSPENSION INTRA-ARTICULAR; INTRALESIONAL; INTRAMUSCULAR; SOFT TISSUE at 11:29

## 2023-01-25 NOTE — PROGRESS NOTES
The ABCs of the Annual Wellness Visit  Initial Medicare Wellness Visit    Subjective   History of Present Illness:  Ben Singh is a 77 y.o. male who presents for an Initial Medicare Wellness Visit.    The following portions of the patient's history were reviewed and   updated as appropriate:   He  has a past medical history of Colonoscopy refused (06/08/2020), Diabetes mellitus type 2, controlled (HCC) (06/08/2020), Diabetes mellitus, type 2 (HCC) (12/03/2018), Essential hypertension, Male erectile dysfunction, unspecified, and Male sexual dysfunction (05/30/2018).  He does not have any pertinent problems on file.  He  has a past surgical history that includes Hydrocele excision / repair.  His family history includes Diabetes in his mother; Heart disease in his father; Lung disease in his father; Stroke in his mother.  He  reports that he has never smoked. He has never used smokeless tobacco. He reports that he does not drink alcohol and does not use drugs.  Current Outpatient Medications   Medication Sig Dispense Refill   • amLODIPine-benazepril (LOTREL) 10-20 MG per capsule Take 1 capsule by mouth Daily. 90 capsule 3   • metFORMIN ER (GLUCOPHAGE-XR) 500 MG 24 hr tablet Take 1 tablet by mouth Daily. 90 tablet 1   • simvastatin (ZOCOR) 20 MG tablet Take 1 tablet by mouth Daily. 90 tablet 3   • triamterene-hydrochlorothiazide (MAXZIDE-25) 37.5-25 MG per tablet Take 1 tablet by mouth Daily. 90 tablet 3   • allopurinol (ZYLOPRIM) 100 MG tablet Take 1 tablet by mouth Daily for 30 days, THEN 2 tablets Daily for 30 days. 90 tablet 0   • allopurinol (ZYLOPRIM) 300 MG tablet Take 1 tablet by mouth Daily. Start after completing 200 mg for 1 month 90 tablet 3     No current facility-administered medications for this visit.     Current Outpatient Medications on File Prior to Visit   Medication Sig   • amLODIPine-benazepril (LOTREL) 10-20 MG per capsule Take 1 capsule by mouth Daily.   • simvastatin (ZOCOR) 20 MG tablet  Take 1 tablet by mouth Daily.   • triamterene-hydrochlorothiazide (MAXZIDE-25) 37.5-25 MG per tablet Take 1 tablet by mouth Daily.     No current facility-administered medications on file prior to visit.     He has No Known Allergies..     Compared to one year ago, the patient feels his physical   health is the same.    Compared to one year ago, the patient feels his mental   health is the same.    Recent Hospitalizations:  He was not admitted to the hospital during the last year.       Current Medical Providers:  Patient Care Team:  Mustapha Wood III, MD as PCP - General (Family Medicine)    Outpatient Medications Prior to Visit   Medication Sig Dispense Refill   • amLODIPine-benazepril (LOTREL) 10-20 MG per capsule Take 1 capsule by mouth Daily. 90 capsule 3   • simvastatin (ZOCOR) 20 MG tablet Take 1 tablet by mouth Daily. 90 tablet 3   • triamterene-hydrochlorothiazide (MAXZIDE-25) 37.5-25 MG per tablet Take 1 tablet by mouth Daily. 90 tablet 3   • metFORMIN ER (GLUCOPHAGE-XR) 500 MG 24 hr tablet Take 1 tablet by mouth Daily. 90 tablet 1     No facility-administered medications prior to visit.       No opioid medication identified on active medication list. I have reviewed chart for other potential  high risk medication/s and harmful drug interactions in the elderly.          Aspirin is not on active medication list.  Aspirin use is not indicated based on review of current medical condition/s. Risk of harm outweighs potential benefits.  .    Patient Active Problem List   Diagnosis   • Male sexual dysfunction   • Essential hypertension   • Diabetes mellitus type 2, controlled (HCC)   • Colonoscopy refused   • Medication management   • Mixed hyperlipidemia     Advance Care Planning  Advance Directive is not on file.  ACP discussion was held with the patient during this visit. Patient does not have an advance directive, information provided.    REVIEW OF SYSTEMS  Lungs no shortness of breath dyspnea  "exertion  Cardiovascular no chest pain no palpitations patient exerts himself  GI patient politely declines colonoscopy did do Hemoccult this time and it was negative    Objective       Vitals:    01/25/23 1030   BP: 132/62   Pulse: 63   Temp: 97.1 °F (36.2 °C)   SpO2: 95%   Weight: 85.3 kg (188 lb)   Height: 182.9 cm (72\")     BMI Readings from Last 1 Encounters:   01/25/23 25.50 kg/m²   BMI is above normal parameters. Recommendations include: educational material    Does the patient have evidence of cognitive impairment? No    PHYSICAL EXAM  General no distress  Lungs clear and equal bilaterally  Cardiovascular regular rhythm no murmur  Abdomen soft nontender no hepatosplenomegaly  Rectal 1+ prostate hypertrophy no nodules no masses Hemoccult was done and is negative here in the office  Musculoskeletal left long finger DIP ganglion cyst    Procedure after careful chlorhexidine prep patient had 5 mg of Depo-Medrol and half cc of Xylocaine injected into the ganglion cyst left long finger DIP  Lab Results   Component Value Date    TRIG 186 (H) 01/23/2023    HDL 39 (L) 01/23/2023    LDL 73 01/23/2023    VLDL 31 01/23/2023    HGBA1C 6.50 (H) 01/23/2023         HEALTH RISK ASSESSMENT    Smoking Status:  Social History     Tobacco Use   Smoking Status Never   Smokeless Tobacco Never     Alcohol Consumption:  Social History     Substance and Sexual Activity   Alcohol Use Never     Fall Risk Screen:    FRANK Fall Risk Assessment was completed, and patient is at LOW risk for falls.Assessment completed on:1/25/2023    Depression Screen:   PHQ-2/PHQ-9 Depression Screening 1/25/2023   Retired PHQ-9 Total Score -   Retired Total Score -   Little Interest or Pleasure in Doing Things 0-->not at all   Feeling Down, Depressed or Hopeless 0-->not at all   PHQ-9: Brief Depression Severity Measure Score 0       Health Habits and Functional and Cognitive Screening:  Functional & Cognitive Status 1/25/2023   Do you have difficulty " preparing food and eating? No   Do you have difficulty bathing yourself, getting dressed or grooming yourself? No   Do you have difficulty using the toilet? No   Do you have difficulty moving around from place to place? No   Do you have trouble with steps or getting out of a bed or a chair? No   Current Diet Well Balanced Diet   Dental Exam Up to date   Eye Exam Up to date   Exercise (times per week) 3 times per week   Current Exercises Include Walking   Do you need help using the phone?  No   Are you deaf or do you have serious difficulty hearing?  No   Do you need help with transportation? No   Do you need help shopping? No   Do you need help preparing meals?  No   Do you need help with housework?  No   Do you need help with laundry? No   Do you need help taking your medications? No   Do you need help managing money? No   Do you ever drive or ride in a car without wearing a seat belt? No   Have you felt unusual stress, anger or loneliness in the last month? No   Who do you live with? Spouse   If you need help, do you have trouble finding someone available to you? No   Have you been bothered in the last four weeks by sexual problems? No   Do you have difficulty concentrating, remembering or making decisions? No       Age-appropriate Screening Schedule:  Refer to the list below for future screening recommendations based on patient's age, sex and/or medical conditions. Orders for these recommended tests are listed in the plan section. The patient has been provided with a written plan.    Health Maintenance   Topic Date Due   • TDAP/TD VACCINES (1 - Tdap) Never done   • ZOSTER VACCINE (1 of 2) Never done   • INFLUENZA VACCINE  08/01/2022   • DIABETIC EYE EXAM  12/22/2022   • URINE MICROALBUMIN  06/16/2023   • HEMOGLOBIN A1C  07/23/2023   • LIPID PANEL  01/23/2024            CMS Preventative Services Quick Reference  Risk Factors Identified During Encounter  None Identified  The above risks/problems have been discussed  with the patient.  Follow up actions/plans if indicated are seen below in the Assessment/Plan Section.  Pertinent information has been shared with the patient in the After Visit Summary.      Follow Up:  No follow-ups on file.     An After Visit Summary and PPPS were made available to the patient.      I spent 45 minutes caring for Ben on this date of service. This time includes time spent by me in the following activities:preparing for the visit, reviewing tests, obtaining and/or reviewing a separately obtained history, performing a medically appropriate examination and/or evaluation , counseling and educating the patient/family/caregiver, ordering medications, tests, or procedures, referring and communicating with other health care professionals , documenting information in the medical record, independently interpreting results and communicating that information with the patient/family/caregiver and care coordination    _________________________________________________________________________________________________--  Evaluation & Management    Chief Complaint  Med Management and Medicare Wellness-subsequent    SUBJECTIVE  Ben Singh presents to [facility]  Patient is 77 years old type 2 diabetes hypertension complaining of history of gout and also on his left long finger has a ganglion cyst    PAST MEDICAL HISTORY  No Known Allergies     Past Surgical History:   • HYDROCELE EXCISION / REPAIR       Social History     Tobacco Use   • Smoking status: Never   • Smokeless tobacco: Never   Substance Use Topics   • Alcohol use: Never       Family History   Problem Relation Age of Onset   • Stroke Mother    • Diabetes Mother    • Heart disease Father    • Lung disease Father         Health Maintenance Due   Topic Date Due   • COVID-19 Vaccine (1) Never done   • Pneumococcal Vaccine 65+ (1 - PCV) Never done   • TDAP/TD VACCINES (1 - Tdap) Never done   • ZOSTER VACCINE (1 of 2)  Never done   • INFLUENZA VACCINE  08/01/2022   • DIABETIC EYE EXAM  12/22/2022      Last Completed Colonoscopy          Discontinued - COLORECTAL CANCER SCREENING  Ordered on 1/25/2023      Frequency changed to Never automatically (Topic No Longer Applies)    06/14/2021  COLONOSCOPY (Declined - pt refused)                  ASSESSMENT & PLAN  Diagnoses and all orders for this visit:    1. Medicare annual wellness visit, subsequent (Primary)    2. Medication management  -     Uric Acid    3. Controlled type 2 diabetes mellitus without complication, without long-term current use of insulin (HCC)  -     metFORMIN ER (GLUCOPHAGE-XR) 500 MG 24 hr tablet; Take 1 tablet by mouth Daily.  Dispense: 90 tablet; Refill: 1    4. Essential hypertension    5. Mixed hyperlipidemia    6. Colonoscopy refused    7. Male sexual dysfunction    8. Swelling of finger joint of left hand  -     Uric Acid    9. Screening for colon cancer  -     POC Occult Blood Stool    10. Ganglion cyst of joint of finger of left hand  Comments:  left long finger  Orders:  -     Joint Aspiration/Injection    11. Gout of left hand, unspecified cause, unspecified chronicity  -     allopurinol (ZYLOPRIM) 100 MG tablet; Take 1 tablet by mouth Daily for 30 days, THEN 2 tablets Daily for 30 days.  Dispense: 90 tablet; Refill: 0  -     allopurinol (ZYLOPRIM) 300 MG tablet; Take 1 tablet by mouth Daily. Start after completing 200 mg for 1 month  Dispense: 90 tablet; Refill: 3      Type 2 diabetes A1c ganglion cyst long finger left hand DIP joint injected with Depo-Medrol 5 history of gout we will do uric acid and start on allopurinol hypertension well controlled colonoscopy politely refused    Joint Aspiration/Injection    Date/Time: 1/25/2023 11:29 AM  Performed by: Mustapha Wood III, MD  Authorized by: Mustapha Wood III, MD   Indications: pain and joint swelling   Body area: finger  Location details: left long finger  Joint: left long DIP  Local anesthesia  used: yes  Anesthesia: local infiltration    Anesthesia:  Local anesthesia used: yes  Local Anesthetic: lidocaine 1% without epinephrine  Anesthetic total: 1 mL    Sedation:  Patient sedated: no    Preparation: Patient was prepped and draped in the usual sterile fashion.  Needle gauge: 25.  Meds administered: 10 mg methylPREDNISolone acetate 40 MG/ML  Patient tolerance: patient tolerated the procedure well with no immediate complications  Comments: Ganglion cyst on left long finger             Patient was given instructions and counseling regarding his condition or for health maintenance advice. Please see specific information pulled into the AVS if appropriate.

## 2023-01-26 LAB
DEVELOPER EXPIRATION DATE: ABNORMAL
DEVELOPER LOT NUMBER: ABNORMAL
EXPIRATION DATE: ABNORMAL
FECAL OCCULT BLOOD SCREEN, POC: NEGATIVE
Lab: 322
NEGATIVE CONTROL: NEGATIVE
POSITIVE CONTROL: POSITIVE

## 2023-01-26 RX ORDER — METHYLPREDNISOLONE ACETATE 40 MG/ML
10 INJECTION, SUSPENSION INTRA-ARTICULAR; INTRALESIONAL; INTRAMUSCULAR; SOFT TISSUE
Status: COMPLETED | OUTPATIENT
Start: 2023-01-25 | End: 2023-01-25

## 2023-06-19 DIAGNOSIS — I10 ESSENTIAL HYPERTENSION: ICD-10-CM

## 2023-06-19 DIAGNOSIS — E78.2 MIXED HYPERLIPIDEMIA: ICD-10-CM

## 2023-06-19 RX ORDER — SIMVASTATIN 20 MG
20 TABLET ORAL DAILY
Qty: 90 TABLET | Refills: 0 | Status: SHIPPED | OUTPATIENT
Start: 2023-06-19

## 2023-06-19 RX ORDER — TRIAMTERENE AND HYDROCHLOROTHIAZIDE 37.5; 25 MG/1; MG/1
1 TABLET ORAL DAILY
Qty: 90 TABLET | Refills: 0 | Status: SHIPPED | OUTPATIENT
Start: 2023-06-19

## 2023-07-20 PROCEDURE — 83036 HEMOGLOBIN GLYCOSYLATED A1C: CPT | Performed by: FAMILY MEDICINE

## 2023-07-25 ENCOUNTER — OFFICE VISIT (OUTPATIENT)
Dept: FAMILY MEDICINE CLINIC | Facility: CLINIC | Age: 78
End: 2023-07-25
Payer: MEDICARE

## 2023-07-25 VITALS
BODY MASS INDEX: 25.87 KG/M2 | HEIGHT: 72 IN | WEIGHT: 191 LBS | TEMPERATURE: 97.8 F | OXYGEN SATURATION: 98 % | HEART RATE: 83 BPM | SYSTOLIC BLOOD PRESSURE: 132 MMHG | DIASTOLIC BLOOD PRESSURE: 68 MMHG

## 2023-07-25 DIAGNOSIS — Z79.899 MEDICATION MANAGEMENT: Primary | ICD-10-CM

## 2023-07-25 DIAGNOSIS — E11.9 CONTROLLED TYPE 2 DIABETES MELLITUS WITHOUT COMPLICATION, WITHOUT LONG-TERM CURRENT USE OF INSULIN: ICD-10-CM

## 2023-07-25 DIAGNOSIS — M1A.0720 IDIOPATHIC CHRONIC GOUT OF LEFT ANKLE WITHOUT TOPHUS: ICD-10-CM

## 2023-07-25 DIAGNOSIS — I10 ESSENTIAL HYPERTENSION: ICD-10-CM

## 2023-07-25 DIAGNOSIS — E78.2 MIXED HYPERLIPIDEMIA: ICD-10-CM

## 2023-07-25 LAB
ALBUMIN UR-MCNC: <1.2 MG/DL
CREAT UR-MCNC: 94 MG/DL
MICROALBUMIN/CREAT UR: NORMAL MG/G{CREAT}

## 2023-07-25 PROCEDURE — 82043 UR ALBUMIN QUANTITATIVE: CPT | Performed by: FAMILY MEDICINE

## 2023-07-25 PROCEDURE — 3078F DIAST BP <80 MM HG: CPT | Performed by: FAMILY MEDICINE

## 2023-07-25 PROCEDURE — 3075F SYST BP GE 130 - 139MM HG: CPT | Performed by: FAMILY MEDICINE

## 2023-07-25 PROCEDURE — 99214 OFFICE O/P EST MOD 30 MIN: CPT | Performed by: FAMILY MEDICINE

## 2023-07-25 PROCEDURE — 82570 ASSAY OF URINE CREATININE: CPT | Performed by: FAMILY MEDICINE

## 2023-07-25 RX ORDER — SIMVASTATIN 20 MG
20 TABLET ORAL DAILY
Qty: 90 TABLET | Refills: 3 | Status: SHIPPED | OUTPATIENT
Start: 2023-07-25

## 2023-07-25 RX ORDER — METFORMIN HYDROCHLORIDE 500 MG/1
500 TABLET, EXTENDED RELEASE ORAL DAILY
Qty: 90 TABLET | Refills: 3 | Status: SHIPPED | OUTPATIENT
Start: 2023-07-25

## 2023-07-25 RX ORDER — TRIAMTERENE AND HYDROCHLOROTHIAZIDE 37.5; 25 MG/1; MG/1
1 TABLET ORAL DAILY
Qty: 90 TABLET | Refills: 3 | Status: SHIPPED | OUTPATIENT
Start: 2023-07-25

## 2023-07-25 RX ORDER — AMLODIPINE BESYLATE AND BENAZEPRIL HYDROCHLORIDE 10; 20 MG/1; MG/1
1 CAPSULE ORAL DAILY
Qty: 90 CAPSULE | Refills: 3 | Status: SHIPPED | OUTPATIENT
Start: 2023-07-25

## 2023-07-25 NOTE — PROGRESS NOTES
"Chief Complaint  Diabetes (6 month follow up)    SUBJECTIVE  Ben Singh presents to [facility]    Patient is a 78-year-old history of hypertension type 2 diabetes 6-month diabetic follow-up    PAST MEDICAL HISTORY  Allergies   Allergen Reactions    Allopurinol Hives     Generalized rash after taking medication        Past Surgical History:    HYDROCELE EXCISION / REPAIR       Social History     Tobacco Use    Smoking status: Never    Smokeless tobacco: Never   Substance Use Topics    Alcohol use: Never       Family History   Problem Relation Age of Onset    Stroke Mother     Diabetes Mother     Heart disease Father     Lung disease Father         Health Maintenance Due   Topic Date Due    COVID-19 Vaccine (1) Never done    Pneumococcal Vaccine 65+ (1 - PCV) Never done    TDAP/TD VACCINES (1 - Tdap) Never done    ZOSTER VACCINE (1 of 2) Never done    URINE MICROALBUMIN  06/16/2023        Last Completed Colonoscopy            Discontinued - COLORECTAL CANCER SCREENING  Discontinued        Frequency changed to Never automatically (Topic No Longer Applies)    01/26/2023  Fecal Occult Blood component of POC Occult Blood Stool    06/14/2021  COLONOSCOPY (Declined - pt refused)                    REVIEW OF SYSTEMS    Vascular no chest pain no palpitation  Respiratory no shortness of breath noted urgency on exertion  GI patient is always a politely refused colonoscopy 6 months ago though when I did the prostate exam we did a Hemoccult and it was negative so suggested if he would allow us to do a Hemoccult once a year that would be a better screen and not doing anything and he said that was okay with him  Skill skeletal placed on allopurinol patient broke out unable to take allopurinol OBJECTIVE  Vitals:    07/25/23 0935   BP: 132/68   Pulse: 83   Temp: 97.8 °F (36.6 °C)   SpO2: 98%   Weight: 86.6 kg (191 lb)   Height: 182.9 cm (72\")     Body mass index is 25.9 kg/m².    PHYSICAL " EXAM    General no distress  Cardiovascular regular rhythm no murmur  Respiratory lungs clear and equal bilaterally  Abdomen soft nontender no hepatosplenomegaly  Skin no cancerous or precancerous lesions    ASSESSMENT & PLAN  Diagnoses and all orders for this visit:    1. Medication management (Primary)  -     Microalbumin / Creatinine Urine Ratio - Urine, Clean Catch    2. Essential hypertension  -     amLODIPine-benazepril (LOTREL) 10-20 MG per capsule; Take 1 capsule by mouth Daily.  Dispense: 90 capsule; Refill: 3  -     triamterene-hydrochlorothiazide (MAXZIDE-25) 37.5-25 MG per tablet; Take 1 tablet by mouth Daily.  Dispense: 90 tablet; Refill: 3    3. Controlled type 2 diabetes mellitus without complication, without long-term current use of insulin  -     Microalbumin / Creatinine Urine Ratio - Urine, Clean Catch  -     metFORMIN ER (GLUCOPHAGE-XR) 500 MG 24 hr tablet; Take 1 tablet by mouth Daily.  Dispense: 90 tablet; Refill: 3    4. Mixed hyperlipidemia  -     simvastatin (ZOCOR) 20 MG tablet; Take 1 tablet by mouth Daily.  Dispense: 90 tablet; Refill: 3    5. Idiopathic chronic gout of left ankle without tophus  -     Uric Acid; Future          Type 2 diabetes hypertension good control gout unable to take allopurinol broke out we will get a uric acid he has not had an attack in 3 years told him if he had others we would have to stop his HCTZ and possibly use another medicine like you Uloric patient said he understood needs a uric acid level            Patient was given instructions and counseling regarding his condition or for health maintenance advice. Please see specific information pulled into the AVS if appropriate.

## 2023-07-26 ENCOUNTER — CLINICAL SUPPORT (OUTPATIENT)
Dept: FAMILY MEDICINE CLINIC | Facility: CLINIC | Age: 78
End: 2023-07-26
Payer: MEDICARE

## 2023-07-26 DIAGNOSIS — M1A.0720 IDIOPATHIC CHRONIC GOUT OF LEFT ANKLE WITHOUT TOPHUS: ICD-10-CM

## 2023-07-26 LAB — URATE SERPL-MCNC: 8.6 MG/DL (ref 3.4–7)

## 2023-07-26 PROCEDURE — 84550 ASSAY OF BLOOD/URIC ACID: CPT | Performed by: FAMILY MEDICINE

## 2023-07-26 PROCEDURE — 36415 COLL VENOUS BLD VENIPUNCTURE: CPT | Performed by: FAMILY MEDICINE

## 2023-12-18 DIAGNOSIS — E11.9 CONTROLLED TYPE 2 DIABETES MELLITUS WITHOUT COMPLICATION, WITHOUT LONG-TERM CURRENT USE OF INSULIN: ICD-10-CM

## 2023-12-18 DIAGNOSIS — I10 ESSENTIAL HYPERTENSION: ICD-10-CM

## 2023-12-18 DIAGNOSIS — E78.2 MIXED HYPERLIPIDEMIA: ICD-10-CM

## 2023-12-18 RX ORDER — TRIAMTERENE AND HYDROCHLOROTHIAZIDE 37.5; 25 MG/1; MG/1
1 TABLET ORAL DAILY
Qty: 90 TABLET | Refills: 0 | Status: SHIPPED | OUTPATIENT
Start: 2023-12-18

## 2023-12-18 RX ORDER — METFORMIN HYDROCHLORIDE 500 MG/1
500 TABLET, EXTENDED RELEASE ORAL DAILY
Qty: 90 TABLET | Refills: 0 | Status: SHIPPED | OUTPATIENT
Start: 2023-12-18

## 2023-12-18 RX ORDER — SIMVASTATIN 20 MG
20 TABLET ORAL DAILY
Qty: 90 TABLET | Refills: 0 | Status: SHIPPED | OUTPATIENT
Start: 2023-12-18

## 2023-12-18 RX ORDER — AMLODIPINE BESYLATE AND BENAZEPRIL HYDROCHLORIDE 10; 20 MG/1; MG/1
1 CAPSULE ORAL DAILY
Qty: 90 CAPSULE | Refills: 0 | Status: SHIPPED | OUTPATIENT
Start: 2023-12-18

## 2024-03-15 DIAGNOSIS — I10 ESSENTIAL HYPERTENSION: ICD-10-CM

## 2024-03-15 RX ORDER — AMLODIPINE BESYLATE AND BENAZEPRIL HYDROCHLORIDE 10; 20 MG/1; MG/1
1 CAPSULE ORAL DAILY
Qty: 90 CAPSULE | Refills: 0 | OUTPATIENT
Start: 2024-03-15

## 2024-03-15 NOTE — TELEPHONE ENCOUNTER
HUB TO RELAY  Patient requesting refill of Amlodipine/benazepril 10/20.   Patient has not established here as a patient with Dr Alexander.  Has appt with Dr Padilla on 03/27/2024. Patient can get refill From Dr Floyd Padilla.

## 2024-03-27 ENCOUNTER — OFFICE VISIT (OUTPATIENT)
Dept: INTERNAL MEDICINE | Age: 79
End: 2024-03-27
Payer: MEDICARE

## 2024-03-27 VITALS
SYSTOLIC BLOOD PRESSURE: 140 MMHG | DIASTOLIC BLOOD PRESSURE: 86 MMHG | WEIGHT: 190 LBS | BODY MASS INDEX: 25.73 KG/M2 | TEMPERATURE: 98.2 F | HEIGHT: 72 IN | OXYGEN SATURATION: 96 % | HEART RATE: 74 BPM

## 2024-03-27 DIAGNOSIS — E11.9 TYPE 2 DIABETES MELLITUS WITHOUT COMPLICATION, WITHOUT LONG-TERM CURRENT USE OF INSULIN: ICD-10-CM

## 2024-03-27 DIAGNOSIS — E55.9 VITAMIN D DEFICIENCY: ICD-10-CM

## 2024-03-27 DIAGNOSIS — E78.2 MIXED HYPERLIPIDEMIA: ICD-10-CM

## 2024-03-27 DIAGNOSIS — N18.31 STAGE 3A CHRONIC KIDNEY DISEASE: ICD-10-CM

## 2024-03-27 DIAGNOSIS — Z12.5 SCREENING PSA (PROSTATE SPECIFIC ANTIGEN): ICD-10-CM

## 2024-03-27 DIAGNOSIS — I10 HYPERTENSION, ESSENTIAL: ICD-10-CM

## 2024-03-27 DIAGNOSIS — I10 ESSENTIAL HYPERTENSION: ICD-10-CM

## 2024-03-27 DIAGNOSIS — Z00.00 ENCOUNTER FOR ANNUAL PHYSICAL EXAM: Primary | ICD-10-CM

## 2024-03-27 NOTE — PROGRESS NOTES
"CHIEF COMPLAINT/ HPI:  Establish Care (New pt establish care, pt is fasting for labs if needed. No concerns at this time. )----              Objective   Vital Signs  Vitals:    03/27/24 0915   BP: 140/86   Pulse: 74   Temp: 98.2 °F (36.8 °C)   SpO2: 96%   Weight: 86.2 kg (190 lb)   Height: 182.9 cm (72.01\")      Body mass index is 25.76 kg/m².  Review of Systems   Constitutional: Negative.    HENT: Negative.     Eyes: Negative.    Respiratory: Negative.     Cardiovascular: Negative.    Gastrointestinal: Negative.    Endocrine: Negative.    Genitourinary: Negative.    Musculoskeletal: Negative.    Allergic/Immunologic: Negative.    Neurological: Negative.    Hematological: Negative.    Psychiatric/Behavioral: Negative.        Physical Exam  Constitutional:       General: He is not in acute distress.     Appearance: Normal appearance.   HENT:      Head: Normocephalic and atraumatic.      Right Ear: Tympanic membrane and ear canal normal.      Left Ear: Tympanic membrane and ear canal normal.      Mouth/Throat:      Mouth: Mucous membranes are moist.      Pharynx: Oropharynx is clear.   Eyes:      General: No scleral icterus.     Extraocular Movements: Extraocular movements intact.      Conjunctiva/sclera: Conjunctivae normal.      Pupils: Pupils are equal, round, and reactive to light.   Neck:      Vascular: No carotid bruit.   Cardiovascular:      Rate and Rhythm: Normal rate and regular rhythm.      Pulses: Normal pulses.      Heart sounds: Normal heart sounds. No murmur heard.     No gallop.   Pulmonary:      Effort: Pulmonary effort is normal. No respiratory distress.      Breath sounds: Normal breath sounds. No wheezing.   Abdominal:      General: Abdomen is flat. Bowel sounds are normal. There is no distension.      Palpations: Abdomen is soft. There is no mass.      Tenderness: There is no abdominal tenderness. There is no guarding.   Musculoskeletal:         General: No swelling or tenderness. Normal range of " "motion.      Cervical back: Normal range of motion and neck supple. No tenderness.      Right lower leg: No edema.      Left lower leg: No edema.   Lymphadenopathy:      Cervical: No cervical adenopathy.   Skin:     General: Skin is warm and dry.      Coloration: Skin is not jaundiced.      Findings: No rash.   Neurological:      General: No focal deficit present.      Mental Status: He is alert and oriented to person, place, and time. Mental status is at baseline.      Motor: No weakness.      Coordination: Coordination normal.      Gait: Gait normal.   Psychiatric:         Mood and Affect: Mood normal.         Behavior: Behavior normal.         Thought Content: Thought content normal.         Judgment: Judgment normal.        Result Review :   No results found for: \"PROBNP\", \"BNP\"          Lab Results   Component Value Date    TSH 4.080 01/23/2023    TSH 1.740 12/13/2021    TSH 4.000 12/10/2020      No results found for: \"FREET4\"   A1C Last 3 Results          7/20/2023    08:56   HGBA1C Last 3 Results   Hemoglobin A1C 7.00                        Visit Diagnoses:    ICD-10-CM ICD-9-CM   1. Encounter for annual physical exam  Z00.00 V70.0   2. Essential hypertension  I10 401.9   3. Mixed hyperlipidemia  E78.2 272.2   4. Hypertension, essential  I10 401.9   5. Type 2 diabetes mellitus without complication, without long-term current use of insulin  E11.9 250.00   6. Stage 3a chronic kidney disease  N18.31 585.3   7. Screening PSA (prostate specific antigen)  Z12.5 V76.44   8. Vitamin D deficiency  E55.9 268.9       Assessment and Plan   Diagnoses and all orders for this visit:    1. Encounter for annual physical exam (Primary)  -     CBC & Differential; Future  -     Comprehensive Metabolic Panel; Future  -     Lipid Panel; Future  -     Hemoglobin A1c; Future  -     Vitamin D,25-Hydroxy; Future  -     Vitamin B12 anemia; Future  -     Folate anemia; Future  -     MicroAlbumin, Urine, Random - Urine, Clean Catch; " Future  -     TSH+Free T4; Future  -     PSA Screen; Future    2. Essential hypertension  -     CBC & Differential; Future  -     Comprehensive Metabolic Panel; Future  -     Lipid Panel; Future  -     Hemoglobin A1c; Future  -     Vitamin D,25-Hydroxy; Future  -     Vitamin B12 anemia; Future  -     Folate anemia; Future  -     MicroAlbumin, Urine, Random - Urine, Clean Catch; Future  -     TSH+Free T4; Future  -     PSA Screen; Future    3. Mixed hyperlipidemia  -     CBC & Differential; Future  -     Comprehensive Metabolic Panel; Future  -     Lipid Panel; Future  -     Hemoglobin A1c; Future  -     Vitamin D,25-Hydroxy; Future  -     Vitamin B12 anemia; Future  -     Folate anemia; Future  -     MicroAlbumin, Urine, Random - Urine, Clean Catch; Future  -     TSH+Free T4; Future  -     PSA Screen; Future    4. Hypertension, essential  -     CBC & Differential; Future  -     Comprehensive Metabolic Panel; Future  -     Lipid Panel; Future  -     Hemoglobin A1c; Future  -     Vitamin D,25-Hydroxy; Future  -     Vitamin B12 anemia; Future  -     Folate anemia; Future  -     MicroAlbumin, Urine, Random - Urine, Clean Catch; Future  -     TSH+Free T4; Future  -     PSA Screen; Future    5. Type 2 diabetes mellitus without complication, without long-term current use of insulin  -     CBC & Differential; Future  -     Comprehensive Metabolic Panel; Future  -     Lipid Panel; Future  -     Hemoglobin A1c; Future  -     Vitamin D,25-Hydroxy; Future  -     Vitamin B12 anemia; Future  -     Folate anemia; Future  -     MicroAlbumin, Urine, Random - Urine, Clean Catch; Future  -     TSH+Free T4; Future  -     PSA Screen; Future    6. Stage 3a chronic kidney disease  -     CBC & Differential; Future  -     Comprehensive Metabolic Panel; Future  -     Lipid Panel; Future  -     Hemoglobin A1c; Future  -     Vitamin D,25-Hydroxy; Future  -     Vitamin B12 anemia; Future  -     Folate anemia; Future  -     MicroAlbumin, Urine,  Random - Urine, Clean Catch; Future  -     TSH+Free T4; Future  -     PSA Screen; Future    7. Screening PSA (prostate specific antigen)  -     CBC & Differential; Future  -     Comprehensive Metabolic Panel; Future  -     Lipid Panel; Future  -     Hemoglobin A1c; Future  -     Vitamin D,25-Hydroxy; Future  -     Vitamin B12 anemia; Future  -     Folate anemia; Future  -     MicroAlbumin, Urine, Random - Urine, Clean Catch; Future  -     TSH+Free T4; Future  -     PSA Screen; Future    8. Vitamin D deficiency  -     CBC & Differential; Future  -     Comprehensive Metabolic Panel; Future  -     Lipid Panel; Future  -     Hemoglobin A1c; Future  -     Vitamin D,25-Hydroxy; Future  -     Vitamin B12 anemia; Future  -     Folate anemia; Future  -     MicroAlbumin, Urine, Random - Urine, Clean Catch; Future  -     TSH+Free T4; Future  -     PSA Screen; Future        BMI is >= 25 and <30. (Overweight) The following options were offered after discussion;: weight loss educational material (shared in after visit summary) and exercise counseling/recommendations     Gout, patient had an allergic reaction to allopurinol, discussed treatment with Medrol Dosepak and/or colchicine if flares up again,    Hypertension, continues on Lotrel 10-20 mg daily, Maxide 25 mg daily,    Hyperlipidemia continues on simvastatin 20 mg daily    Diabetes type 2, hemoglobin A1c 7.0 July 2023, continues on metformin 500 mg daily--- consider adding Jardiance, to help with renal protection also, discussed briefly March 2024,    CKD stage IIIa creatinine has been stable over the past 4 to 5 years at 1.2-1.4, will follow        Follow Up   Return in about 3 months (around 6/27/2024).  Patient was given instructions and counseling regarding his condition or for health maintenance advice. Please see specific information pulled into the AVS if appropriate.

## 2024-05-29 ENCOUNTER — LAB (OUTPATIENT)
Dept: INTERNAL MEDICINE | Age: 79
End: 2024-05-29
Payer: MEDICARE

## 2024-05-29 DIAGNOSIS — N18.31 STAGE 3A CHRONIC KIDNEY DISEASE: ICD-10-CM

## 2024-05-29 DIAGNOSIS — Z00.00 ENCOUNTER FOR ANNUAL PHYSICAL EXAM: ICD-10-CM

## 2024-05-29 DIAGNOSIS — Z12.5 SCREENING PSA (PROSTATE SPECIFIC ANTIGEN): ICD-10-CM

## 2024-05-29 DIAGNOSIS — I10 HYPERTENSION, ESSENTIAL: ICD-10-CM

## 2024-05-29 DIAGNOSIS — E11.9 TYPE 2 DIABETES MELLITUS WITHOUT COMPLICATION, WITHOUT LONG-TERM CURRENT USE OF INSULIN: ICD-10-CM

## 2024-05-29 DIAGNOSIS — I10 ESSENTIAL HYPERTENSION: ICD-10-CM

## 2024-05-29 DIAGNOSIS — E78.2 MIXED HYPERLIPIDEMIA: ICD-10-CM

## 2024-05-29 DIAGNOSIS — E55.9 VITAMIN D DEFICIENCY: ICD-10-CM

## 2024-05-29 LAB
25(OH)D3 SERPL-MCNC: 18.2 NG/ML (ref 30–100)
ALBUMIN SERPL-MCNC: 4.2 G/DL (ref 3.5–5.2)
ALBUMIN/GLOB SERPL: 1.7 G/DL
ALP SERPL-CCNC: 36 U/L (ref 39–117)
ALT SERPL W P-5'-P-CCNC: 18 U/L (ref 1–41)
ANION GAP SERPL CALCULATED.3IONS-SCNC: 13.8 MMOL/L (ref 5–15)
AST SERPL-CCNC: 22 U/L (ref 1–40)
BASOPHILS # BLD AUTO: 0.05 10*3/MM3 (ref 0–0.2)
BASOPHILS NFR BLD AUTO: 0.6 % (ref 0–1.5)
BILIRUB SERPL-MCNC: 0.5 MG/DL (ref 0–1.2)
BUN SERPL-MCNC: 26 MG/DL (ref 8–23)
BUN/CREAT SERPL: 16.5 (ref 7–25)
CALCIUM SPEC-SCNC: 9.6 MG/DL (ref 8.6–10.5)
CHLORIDE SERPL-SCNC: 103 MMOL/L (ref 98–107)
CHOLEST SERPL-MCNC: 136 MG/DL (ref 0–200)
CO2 SERPL-SCNC: 26.2 MMOL/L (ref 22–29)
CREAT SERPL-MCNC: 1.58 MG/DL (ref 0.76–1.27)
DEPRECATED RDW RBC AUTO: 44.9 FL (ref 37–54)
EGFRCR SERPLBLD CKD-EPI 2021: 44.5 ML/MIN/1.73
EOSINOPHIL # BLD AUTO: 0.25 10*3/MM3 (ref 0–0.4)
EOSINOPHIL NFR BLD AUTO: 3 % (ref 0.3–6.2)
ERYTHROCYTE [DISTWIDTH] IN BLOOD BY AUTOMATED COUNT: 13.4 % (ref 12.3–15.4)
FOLATE SERPL-MCNC: 12.9 NG/ML (ref 4.78–24.2)
GLOBULIN UR ELPH-MCNC: 2.5 GM/DL
GLUCOSE SERPL-MCNC: 110 MG/DL (ref 65–99)
HBA1C MFR BLD: 7.1 % (ref 4.8–5.6)
HCT VFR BLD AUTO: 44.6 % (ref 37.5–51)
HDLC SERPL-MCNC: 37 MG/DL (ref 40–60)
HGB BLD-MCNC: 14.4 G/DL (ref 13–17.7)
IMM GRANULOCYTES # BLD AUTO: 0.01 10*3/MM3 (ref 0–0.05)
IMM GRANULOCYTES NFR BLD AUTO: 0.1 % (ref 0–0.5)
LDLC SERPL CALC-MCNC: 68 MG/DL (ref 0–100)
LDLC/HDLC SERPL: 1.66 {RATIO}
LYMPHOCYTES # BLD AUTO: 2.86 10*3/MM3 (ref 0.7–3.1)
LYMPHOCYTES NFR BLD AUTO: 34.5 % (ref 19.6–45.3)
MCH RBC QN AUTO: 29.3 PG (ref 26.6–33)
MCHC RBC AUTO-ENTMCNC: 32.3 G/DL (ref 31.5–35.7)
MCV RBC AUTO: 90.8 FL (ref 79–97)
MONOCYTES # BLD AUTO: 0.6 10*3/MM3 (ref 0.1–0.9)
MONOCYTES NFR BLD AUTO: 7.2 % (ref 5–12)
NEUTROPHILS NFR BLD AUTO: 4.52 10*3/MM3 (ref 1.7–7)
NEUTROPHILS NFR BLD AUTO: 54.6 % (ref 42.7–76)
NRBC BLD AUTO-RTO: 0 /100 WBC (ref 0–0.2)
PLATELET # BLD AUTO: 209 10*3/MM3 (ref 140–450)
PMV BLD AUTO: 12.1 FL (ref 6–12)
POTASSIUM SERPL-SCNC: 3.1 MMOL/L (ref 3.5–5.2)
PROT SERPL-MCNC: 6.7 G/DL (ref 6–8.5)
PSA SERPL-MCNC: 1.48 NG/ML (ref 0–4)
RBC # BLD AUTO: 4.91 10*6/MM3 (ref 4.14–5.8)
SODIUM SERPL-SCNC: 143 MMOL/L (ref 136–145)
T4 FREE SERPL-MCNC: 0.97 NG/DL (ref 0.93–1.7)
TRIGL SERPL-MCNC: 187 MG/DL (ref 0–150)
TSH SERPL DL<=0.05 MIU/L-ACNC: 4.17 UIU/ML (ref 0.27–4.2)
VIT B12 BLD-MCNC: 313 PG/ML (ref 211–946)
VLDLC SERPL-MCNC: 31 MG/DL (ref 5–40)
WBC NRBC COR # BLD AUTO: 8.29 10*3/MM3 (ref 3.4–10.8)

## 2024-05-29 PROCEDURE — 82746 ASSAY OF FOLIC ACID SERUM: CPT | Performed by: INTERNAL MEDICINE

## 2024-05-29 PROCEDURE — 80053 COMPREHEN METABOLIC PANEL: CPT | Performed by: INTERNAL MEDICINE

## 2024-05-29 PROCEDURE — 85025 COMPLETE CBC W/AUTO DIFF WBC: CPT | Performed by: INTERNAL MEDICINE

## 2024-05-29 PROCEDURE — 36415 COLL VENOUS BLD VENIPUNCTURE: CPT | Performed by: INTERNAL MEDICINE

## 2024-05-29 PROCEDURE — 84439 ASSAY OF FREE THYROXINE: CPT | Performed by: INTERNAL MEDICINE

## 2024-05-29 PROCEDURE — 83036 HEMOGLOBIN GLYCOSYLATED A1C: CPT | Performed by: INTERNAL MEDICINE

## 2024-05-29 PROCEDURE — 84443 ASSAY THYROID STIM HORMONE: CPT | Performed by: INTERNAL MEDICINE

## 2024-05-29 PROCEDURE — 82607 VITAMIN B-12: CPT | Performed by: INTERNAL MEDICINE

## 2024-05-29 PROCEDURE — G0103 PSA SCREENING: HCPCS | Performed by: INTERNAL MEDICINE

## 2024-05-29 PROCEDURE — 80061 LIPID PANEL: CPT | Performed by: INTERNAL MEDICINE

## 2024-05-29 PROCEDURE — 82306 VITAMIN D 25 HYDROXY: CPT | Performed by: INTERNAL MEDICINE

## 2024-05-30 ENCOUNTER — TELEPHONE (OUTPATIENT)
Dept: INTERNAL MEDICINE | Age: 79
End: 2024-05-30
Payer: MEDICARE

## 2024-06-04 ENCOUNTER — OFFICE VISIT (OUTPATIENT)
Dept: INTERNAL MEDICINE | Age: 79
End: 2024-06-04
Payer: MEDICARE

## 2024-06-04 VITALS
OXYGEN SATURATION: 96 % | SYSTOLIC BLOOD PRESSURE: 136 MMHG | TEMPERATURE: 98.2 F | DIASTOLIC BLOOD PRESSURE: 77 MMHG | WEIGHT: 186 LBS | HEIGHT: 72 IN | BODY MASS INDEX: 25.19 KG/M2 | HEART RATE: 82 BPM

## 2024-06-04 DIAGNOSIS — E11.22 TYPE 2 DIABETES MELLITUS WITH STAGE 3A CHRONIC KIDNEY DISEASE, WITHOUT LONG-TERM CURRENT USE OF INSULIN: ICD-10-CM

## 2024-06-04 DIAGNOSIS — Z00.00 ENCOUNTER FOR ANNUAL PHYSICAL EXAM: ICD-10-CM

## 2024-06-04 DIAGNOSIS — Z00.00 MEDICARE ANNUAL WELLNESS VISIT, SUBSEQUENT: Primary | ICD-10-CM

## 2024-06-04 DIAGNOSIS — E11.9 TYPE 2 DIABETES MELLITUS WITHOUT COMPLICATION, WITHOUT LONG-TERM CURRENT USE OF INSULIN: ICD-10-CM

## 2024-06-04 DIAGNOSIS — N18.31 TYPE 2 DIABETES MELLITUS WITH STAGE 3A CHRONIC KIDNEY DISEASE, WITHOUT LONG-TERM CURRENT USE OF INSULIN: ICD-10-CM

## 2024-06-04 DIAGNOSIS — E78.2 MIXED HYPERLIPIDEMIA: ICD-10-CM

## 2024-06-04 DIAGNOSIS — I10 ESSENTIAL HYPERTENSION: ICD-10-CM

## 2024-06-04 DIAGNOSIS — N18.31 STAGE 3A CHRONIC KIDNEY DISEASE: ICD-10-CM

## 2024-06-04 DIAGNOSIS — E55.9 VITAMIN D DEFICIENCY: ICD-10-CM

## 2024-06-04 DIAGNOSIS — I10 HYPERTENSION, ESSENTIAL: ICD-10-CM

## 2024-06-04 DIAGNOSIS — Z12.5 SCREENING PSA (PROSTATE SPECIFIC ANTIGEN): ICD-10-CM

## 2024-06-04 LAB — ALBUMIN UR-MCNC: 1.5 MG/DL

## 2024-06-04 PROCEDURE — 3078F DIAST BP <80 MM HG: CPT | Performed by: INTERNAL MEDICINE

## 2024-06-04 PROCEDURE — 82043 UR ALBUMIN QUANTITATIVE: CPT | Performed by: INTERNAL MEDICINE

## 2024-06-04 PROCEDURE — 1170F FXNL STATUS ASSESSED: CPT | Performed by: INTERNAL MEDICINE

## 2024-06-04 PROCEDURE — G0439 PPPS, SUBSEQ VISIT: HCPCS | Performed by: INTERNAL MEDICINE

## 2024-06-04 PROCEDURE — 3075F SYST BP GE 130 - 139MM HG: CPT | Performed by: INTERNAL MEDICINE

## 2024-06-04 PROCEDURE — 99213 OFFICE O/P EST LOW 20 MIN: CPT | Performed by: INTERNAL MEDICINE

## 2024-06-04 RX ORDER — AMLODIPINE BESYLATE AND BENAZEPRIL HYDROCHLORIDE 10; 20 MG/1; MG/1
1 CAPSULE ORAL DAILY
Qty: 90 CAPSULE | Refills: 3 | Status: SHIPPED | OUTPATIENT
Start: 2024-06-04

## 2024-06-04 RX ORDER — TRIAMTERENE AND HYDROCHLOROTHIAZIDE 37.5; 25 MG/1; MG/1
1 TABLET ORAL DAILY
Qty: 90 TABLET | Refills: 3 | Status: SHIPPED | OUTPATIENT
Start: 2024-06-04

## 2024-06-04 RX ORDER — METFORMIN HYDROCHLORIDE 500 MG/1
500 TABLET, EXTENDED RELEASE ORAL DAILY
Qty: 90 TABLET | Refills: 3 | Status: SHIPPED | OUTPATIENT
Start: 2024-06-04

## 2024-06-04 RX ORDER — SIMVASTATIN 20 MG
20 TABLET ORAL DAILY
Qty: 90 TABLET | Refills: 3 | Status: SHIPPED | OUTPATIENT
Start: 2024-06-04

## 2024-06-04 NOTE — PROGRESS NOTES
"CHIEF COMPLAINT/ HPI: Patient is here to follow-up with recent lab work, kidney issues of concern, says he is doing well he is taking his medications, he is staying very active, he is also here for annual wellness questions,  Hypertension (Routine follow up, Lab follow up. Med refill. )              Objective   Vital Signs  Vitals:    06/04/24 1050   BP: 136/77   Pulse: 82   Temp: 98.2 °F (36.8 °C)   SpO2: 96%   Weight: 84.4 kg (186 lb)   Height: 182.9 cm (72.01\")      Body mass index is 25.22 kg/m².  Review of Systems   Constitutional: Negative.    HENT: Negative.     Eyes: Negative.    Respiratory: Negative.     Cardiovascular: Negative.    Gastrointestinal: Negative.    Endocrine: Negative.    Genitourinary: Negative.    Musculoskeletal: Negative.    Allergic/Immunologic: Negative.    Neurological: Negative.    Hematological: Negative.    Psychiatric/Behavioral: Negative.        Physical Exam  Constitutional:       General: He is not in acute distress.     Appearance: Normal appearance.   HENT:      Head: Normocephalic.      Mouth/Throat:      Mouth: Mucous membranes are moist.   Eyes:      Conjunctiva/sclera: Conjunctivae normal.      Pupils: Pupils are equal, round, and reactive to light.   Cardiovascular:      Rate and Rhythm: Normal rate and regular rhythm.      Pulses: Normal pulses.      Heart sounds: Normal heart sounds.   Pulmonary:      Effort: Pulmonary effort is normal.      Breath sounds: Normal breath sounds.   Abdominal:      General: Abdomen is flat. Bowel sounds are normal.      Palpations: Abdomen is soft.   Musculoskeletal:         General: No swelling. Normal range of motion.      Cervical back: Neck supple.   Skin:     General: Skin is warm and dry.      Coloration: Skin is not jaundiced.   Neurological:      General: No focal deficit present.      Mental Status: He is alert and oriented to person, place, and time. Mental status is at baseline.   Psychiatric:         Mood and Affect: Mood " "normal.         Behavior: Behavior normal.         Thought Content: Thought content normal.         Judgment: Judgment normal.        Result Review :   No results found for: \"PROBNP\", \"BNP\"  CMP          5/29/2024    08:34   CMP   Glucose 110    BUN 26    Creatinine 1.58    EGFR 44.5    Sodium 143    Potassium 3.1    Chloride 103    Calcium 9.6    Total Protein 6.7    Albumin 4.2    Globulin 2.5    Total Bilirubin 0.5    Alkaline Phosphatase 36    AST (SGOT) 22    ALT (SGPT) 18    Albumin/Globulin Ratio 1.7    BUN/Creatinine Ratio 16.5    Anion Gap 13.8      CBC w/diff          5/29/2024    08:34   CBC w/Diff   WBC 8.29    RBC 4.91    Hemoglobin 14.4    Hematocrit 44.6    MCV 90.8    MCH 29.3    MCHC 32.3    RDW 13.4    Platelets 209    Neutrophil Rel % 54.6    Immature Granulocyte Rel % 0.1    Lymphocyte Rel % 34.5    Monocyte Rel % 7.2    Eosinophil Rel % 3.0    Basophil Rel % 0.6       Lipid Panel          5/29/2024    08:34   Lipid Panel   Total Cholesterol 136    Triglycerides 187    HDL Cholesterol 37    VLDL Cholesterol 31    LDL Cholesterol  68    LDL/HDL Ratio 1.66       Lab Results   Component Value Date    TSH 4.170 05/29/2024    TSH 4.080 01/23/2023    TSH 1.740 12/13/2021      Lab Results   Component Value Date    FREET4 0.97 05/29/2024      A1C Last 3 Results          7/20/2023    08:56 5/29/2024    08:34   HGBA1C Last 3 Results   Hemoglobin A1C 7.00  7.10       PSA          5/29/2024    08:34   PSA   PSA 1.480                     Visit Diagnoses:    ICD-10-CM ICD-9-CM   1. Medicare annual wellness visit, subsequent  Z00.00 V70.0   2. Essential hypertension  I10 401.9   3. Mixed hyperlipidemia  E78.2 272.2   4. Vitamin D deficiency  E55.9 268.9   5. Stage 3a chronic kidney disease  N18.31 585.3   6. Screening PSA (prostate specific antigen)  Z12.5 V76.44   7. Type 2 diabetes mellitus with stage 3a chronic kidney disease, without long-term current use of insulin  E11.22 250.40    N18.31 585.3 "       Assessment and Plan   Diagnoses and all orders for this visit:    1. Medicare annual wellness visit, subsequent (Primary)  -     MicroAlbumin, Urine, Random - Urine, Clean Catch; Future    2. Essential hypertension  -     amLODIPine-benazepril (LOTREL) 10-20 MG per capsule; Take 1 capsule by mouth Daily.  Dispense: 90 capsule; Refill: 3  -     triamterene-hydrochlorothiazide (MAXZIDE-25) 37.5-25 MG per tablet; Take 1 tablet by mouth Daily.  Dispense: 90 tablet; Refill: 3  -     CBC & Differential; Future  -     Comprehensive Metabolic Panel; Future  -     Hemoglobin A1c; Future  -     MicroAlbumin, Urine, Random - Urine, Clean Catch; Future    3. Mixed hyperlipidemia  -     simvastatin (ZOCOR) 20 MG tablet; Take 1 tablet by mouth Daily.  Dispense: 90 tablet; Refill: 3  -     CBC & Differential; Future  -     Comprehensive Metabolic Panel; Future  -     Hemoglobin A1c; Future  -     MicroAlbumin, Urine, Random - Urine, Clean Catch; Future    4. Vitamin D deficiency  -     CBC & Differential; Future  -     Comprehensive Metabolic Panel; Future  -     Hemoglobin A1c; Future  -     MicroAlbumin, Urine, Random - Urine, Clean Catch; Future    5. Stage 3a chronic kidney disease  -     CBC & Differential; Future  -     Comprehensive Metabolic Panel; Future  -     Hemoglobin A1c; Future  -     MicroAlbumin, Urine, Random - Urine, Clean Catch; Future    6. Screening PSA (prostate specific antigen)  -     CBC & Differential; Future  -     Comprehensive Metabolic Panel; Future  -     Hemoglobin A1c; Future  -     MicroAlbumin, Urine, Random - Urine, Clean Catch; Future    7. Type 2 diabetes mellitus with stage 3a chronic kidney disease, without long-term current use of insulin  -     CBC & Differential; Future  -     Comprehensive Metabolic Panel; Future  -     Hemoglobin A1c; Future  -     MicroAlbumin, Urine, Random - Urine, Clean Catch; Future    Other orders  -     metFORMIN ER (GLUCOPHAGE-XR) 500 MG 24 hr tablet;  Take 1 tablet by mouth Daily.  Dispense: 90 tablet; Refill: 3    Annual wellness visit questionnaire completed June 4, 2024    Gout, patient had an allergic reaction to allopurinol, discussed treatment with Medrol Dosepak and/or colchicine if flares up again,    Hypertension, continues  Lotrel 10-20 mg daily, Maxide 25 mg daily,    Hyperlipidemia continues simvastatin 20 mg daily    Diabetes type 2, hemoglobin A1c 7.1 may 2024 --, continues on metformin 500 mg daily--- consider adding Jardiance, to help with renal protection also, discussed briefly March 2024,---consider decreasing maxide if adding jardiance ? June 2024 ,     CKD stage IIIa creatinine has been stable over the past 4 to 5 years at 1.2-1.4, will follow    PSA 1.4 May 29, 2024      Follow Up   Return in about 4 months (around 10/4/2024).  Patient was given instructions and counseling regarding his condition or for health maintenance advice. Please see specific information pulled into the AVS if appropriate.

## 2024-06-04 NOTE — PROGRESS NOTES
The ABCs of the Annual Wellness Visit  Subsequent Medicare Wellness Visit    Subjective      Ben Singh is a 79 y.o. male who presents for a Subsequent Medicare Wellness Visit.    The following portions of the patient's history were reviewed and   updated as appropriate: allergies, current medications, past family history, past medical history, past social history, past surgical history, and problem list.    Compared to one year ago, the patient feels his physical   health is the same.    Compared to one year ago, the patient feels his mental   health is the same.    Recent Hospitalizations:  He was not admitted to the hospital during the last year.       Current Medical Providers:  Patient Care Team:  Tim Padilla MD as PCP - General (Internal Medicine)    Outpatient Medications Prior to Visit   Medication Sig Dispense Refill    Potassium 99 MG tablet Take 1 tablet by mouth Daily.      amLODIPine-benazepril (LOTREL) 10-20 MG per capsule Take 1 capsule by mouth Daily. 90 capsule 0    metFORMIN ER (GLUCOPHAGE-XR) 500 MG 24 hr tablet Take 1 tablet by mouth Daily. 90 tablet 0    simvastatin (ZOCOR) 20 MG tablet Take 1 tablet by mouth Daily. 90 tablet 0    triamterene-hydrochlorothiazide (MAXZIDE-25) 37.5-25 MG per tablet Take 1 tablet by mouth Daily. 90 tablet 0     No facility-administered medications prior to visit.       No opioid medication identified on active medication list. I have reviewed chart for other potential  high risk medication/s and harmful drug interactions in the elderly.        Aspirin is not on active medication list.  Aspirin use is not indicated based on review of current medical condition/s. Risk of harm outweighs potential benefits.  .    Patient Active Problem List   Diagnosis    Male sexual dysfunction    Essential hypertension    Diabetes mellitus type 2, controlled    Colonoscopy refused    Medication management    Mixed hyperlipidemia    Hypertension, essential    Type 2  "diabetes mellitus without complication, without long-term current use of insulin    Encounter for annual physical exam    Stage 3a chronic kidney disease    Vitamin D deficiency    Screening PSA (prostate specific antigen)    Type 2 diabetes mellitus with stage 3a chronic kidney disease, without long-term current use of insulin    Medicare annual wellness visit, subsequent     Advance Care Planning   Advance Care Planning     Advance Directive is not on file.  ACP discussion was held with the patient during this visit. Patient has an advance directive (not in EMR), copy requested.     Objective    Vitals:    24 1050   BP: 136/77   Pulse: 82   Temp: 98.2 °F (36.8 °C)   SpO2: 96%   Weight: 84.4 kg (186 lb)   Height: 182.9 cm (72.01\")     Estimated body mass index is 25.22 kg/m² as calculated from the following:    Height as of this encounter: 182.9 cm (72.01\").    Weight as of this encounter: 84.4 kg (186 lb).           Does the patient have evidence of cognitive impairment?   No    Lab Results   Component Value Date    TRIG 187 (H) 2024    HDL 37 (L) 2024    LDL 68 2024    VLDL 31 2024    HGBA1C 7.10 (H) 2024          HEALTH RISK ASSESSMENT    Smoking Status:  Social History     Tobacco Use   Smoking Status Never   Smokeless Tobacco Never     Alcohol Consumption:  Social History     Substance and Sexual Activity   Alcohol Use Never     Fall Risk Screen:    STEADI Fall Risk Assessment was completed, and patient is at LOW risk for falls.Assessment completed on:3/27/2024    Depression Screening:      3/27/2024     9:20 AM   PHQ-2/PHQ-9 Depression Screening   Little Interest or Pleasure in Doing Things 0-->not at all   Feeling Down, Depressed or Hopeless 0-->not at all   PHQ-9: Brief Depression Severity Measure Score 0       Health Habits and Functional and Cognitive Screenin/4/2024    11:00 AM   Functional & Cognitive Status   Do you have difficulty preparing food and eating? " No   Do you have difficulty bathing yourself, getting dressed or grooming yourself? No   Do you have difficulty using the toilet? No   Do you have difficulty moving around from place to place? No   Do you have trouble with steps or getting out of a bed or a chair? No   Current Diet Well Balanced Diet   Dental Exam Up to date   Eye Exam Up to date   Exercise (times per week) 5 times per week   Current Exercises Include Walking;Yard Work   Do you need help using the phone?  No   Are you deaf or do you have serious difficulty hearing?  No   Do you need help to go to places out of walking distance? No   Do you need help shopping? No   Do you need help preparing meals?  No   Do you need help with housework?  No   Do you need help with laundry? No   Do you need help taking your medications? No   Do you need help managing money? No   Do you ever drive or ride in a car without wearing a seat belt? No   Have you felt unusual stress, anger or loneliness in the last month? No   Who do you live with? Spouse   If you need help, do you have trouble finding someone available to you? No   Have you been bothered in the last four weeks by sexual problems? No   Do you have difficulty concentrating, remembering or making decisions? No       Age-appropriate Screening Schedule:  Refer to the list below for future screening recommendations based on patient's age, sex and/or medical conditions. Orders for these recommended tests are listed in the plan section. The patient has been provided with a written plan.    Health Maintenance   Topic Date Due    Pneumococcal Vaccine 65+ (1 of 2 - PCV) Never done    TDAP/TD VACCINES (1 - Tdap) Never done    ZOSTER VACCINE (1 of 2) Never done    RSV Vaccine - Adults (1 - 1-dose 60+ series) Never done    COVID-19 Vaccine (1 - 2023-24 season) Never done    URINE MICROALBUMIN  07/25/2024    INFLUENZA VACCINE  08/01/2024    HEMOGLOBIN A1C  11/29/2024    DIABETIC EYE EXAM  01/29/2025    BMI FOLLOWUP   03/27/2025    LIPID PANEL  05/29/2025    ANNUAL WELLNESS VISIT  06/04/2025    HEPATITIS C SCREENING  Completed    COLORECTAL CANCER SCREENING  Discontinued                  CMS Preventative Services Quick Reference  Risk Factors Identified During Encounter:    None Identified    The above risks/problems have been discussed with the patient.  Pertinent information has been shared with the patient in the After Visit Summary.    Diagnoses and all orders for this visit:    1. Medicare annual wellness visit, subsequent (Primary)    2. Essential hypertension  -     amLODIPine-benazepril (LOTREL) 10-20 MG per capsule; Take 1 capsule by mouth Daily.  Dispense: 90 capsule; Refill: 3  -     triamterene-hydrochlorothiazide (MAXZIDE-25) 37.5-25 MG per tablet; Take 1 tablet by mouth Daily.  Dispense: 90 tablet; Refill: 3  -     CBC & Differential; Future  -     Comprehensive Metabolic Panel; Future  -     Hemoglobin A1c; Future    3. Mixed hyperlipidemia  -     simvastatin (ZOCOR) 20 MG tablet; Take 1 tablet by mouth Daily.  Dispense: 90 tablet; Refill: 3  -     CBC & Differential; Future  -     Comprehensive Metabolic Panel; Future  -     Hemoglobin A1c; Future    4. Vitamin D deficiency  -     CBC & Differential; Future  -     Comprehensive Metabolic Panel; Future  -     Hemoglobin A1c; Future    5. Stage 3a chronic kidney disease  -     CBC & Differential; Future  -     Comprehensive Metabolic Panel; Future  -     Hemoglobin A1c; Future    6. Screening PSA (prostate specific antigen)  -     CBC & Differential; Future  -     Comprehensive Metabolic Panel; Future  -     Hemoglobin A1c; Future    7. Type 2 diabetes mellitus with stage 3a chronic kidney disease, without long-term current use of insulin  -     CBC & Differential; Future  -     Comprehensive Metabolic Panel; Future  -     Hemoglobin A1c; Future    Other orders  -     metFORMIN ER (GLUCOPHAGE-XR) 500 MG 24 hr tablet; Take 1 tablet by mouth Daily.  Dispense: 90  tablet; Refill: 3        Follow Up:   Next Medicare Wellness visit to be scheduled in 1 year.      An After Visit Summary and PPPS were made available to the patient.

## 2024-10-07 ENCOUNTER — LAB (OUTPATIENT)
Dept: INTERNAL MEDICINE | Age: 79
End: 2024-10-07
Payer: MEDICARE

## 2024-10-07 DIAGNOSIS — Z12.5 SCREENING PSA (PROSTATE SPECIFIC ANTIGEN): ICD-10-CM

## 2024-10-07 DIAGNOSIS — E78.2 MIXED HYPERLIPIDEMIA: ICD-10-CM

## 2024-10-07 DIAGNOSIS — N18.31 STAGE 3A CHRONIC KIDNEY DISEASE: ICD-10-CM

## 2024-10-07 DIAGNOSIS — E55.9 VITAMIN D DEFICIENCY: ICD-10-CM

## 2024-10-07 DIAGNOSIS — I10 ESSENTIAL HYPERTENSION: ICD-10-CM

## 2024-10-07 DIAGNOSIS — N18.31 TYPE 2 DIABETES MELLITUS WITH STAGE 3A CHRONIC KIDNEY DISEASE, WITHOUT LONG-TERM CURRENT USE OF INSULIN: ICD-10-CM

## 2024-10-07 DIAGNOSIS — Z00.00 MEDICARE ANNUAL WELLNESS VISIT, SUBSEQUENT: ICD-10-CM

## 2024-10-07 DIAGNOSIS — E11.22 TYPE 2 DIABETES MELLITUS WITH STAGE 3A CHRONIC KIDNEY DISEASE, WITHOUT LONG-TERM CURRENT USE OF INSULIN: ICD-10-CM

## 2024-10-07 LAB
ALBUMIN SERPL-MCNC: 4.1 G/DL (ref 3.5–5.2)
ALBUMIN UR-MCNC: <1.2 MG/DL
ALBUMIN/GLOB SERPL: 1.4 G/DL
ALP SERPL-CCNC: 37 U/L (ref 39–117)
ALT SERPL W P-5'-P-CCNC: 19 U/L (ref 1–41)
ANION GAP SERPL CALCULATED.3IONS-SCNC: 14.7 MMOL/L (ref 5–15)
AST SERPL-CCNC: 28 U/L (ref 1–40)
BASOPHILS # BLD AUTO: 0.07 10*3/MM3 (ref 0–0.2)
BASOPHILS NFR BLD AUTO: 0.7 % (ref 0–1.5)
BILIRUB SERPL-MCNC: 0.6 MG/DL (ref 0–1.2)
BUN SERPL-MCNC: 23 MG/DL (ref 8–23)
BUN/CREAT SERPL: 16.7 (ref 7–25)
CALCIUM SPEC-SCNC: 10 MG/DL (ref 8.6–10.5)
CHLORIDE SERPL-SCNC: 104 MMOL/L (ref 98–107)
CO2 SERPL-SCNC: 26.3 MMOL/L (ref 22–29)
CREAT SERPL-MCNC: 1.38 MG/DL (ref 0.76–1.27)
DEPRECATED RDW RBC AUTO: 43.3 FL (ref 37–54)
EGFRCR SERPLBLD CKD-EPI 2021: 52.3 ML/MIN/1.73
EOSINOPHIL # BLD AUTO: 0.32 10*3/MM3 (ref 0–0.4)
EOSINOPHIL NFR BLD AUTO: 3.2 % (ref 0.3–6.2)
ERYTHROCYTE [DISTWIDTH] IN BLOOD BY AUTOMATED COUNT: 12.9 % (ref 12.3–15.4)
GLOBULIN UR ELPH-MCNC: 2.9 GM/DL
GLUCOSE SERPL-MCNC: 116 MG/DL (ref 65–99)
HBA1C MFR BLD: 6.8 % (ref 4.8–5.6)
HCT VFR BLD AUTO: 44 % (ref 37.5–51)
HGB BLD-MCNC: 14.4 G/DL (ref 13–17.7)
IMM GRANULOCYTES # BLD AUTO: 0.02 10*3/MM3 (ref 0–0.05)
IMM GRANULOCYTES NFR BLD AUTO: 0.2 % (ref 0–0.5)
LYMPHOCYTES # BLD AUTO: 4.15 10*3/MM3 (ref 0.7–3.1)
LYMPHOCYTES NFR BLD AUTO: 41.1 % (ref 19.6–45.3)
MCH RBC QN AUTO: 29.9 PG (ref 26.6–33)
MCHC RBC AUTO-ENTMCNC: 32.7 G/DL (ref 31.5–35.7)
MCV RBC AUTO: 91.5 FL (ref 79–97)
MONOCYTES # BLD AUTO: 0.75 10*3/MM3 (ref 0.1–0.9)
MONOCYTES NFR BLD AUTO: 7.4 % (ref 5–12)
NEUTROPHILS NFR BLD AUTO: 4.78 10*3/MM3 (ref 1.7–7)
NEUTROPHILS NFR BLD AUTO: 47.4 % (ref 42.7–76)
NRBC BLD AUTO-RTO: 0 /100 WBC (ref 0–0.2)
PLATELET # BLD AUTO: 225 10*3/MM3 (ref 140–450)
PMV BLD AUTO: 12.3 FL (ref 6–12)
POTASSIUM SERPL-SCNC: 3.4 MMOL/L (ref 3.5–5.2)
PROT SERPL-MCNC: 7 G/DL (ref 6–8.5)
RBC # BLD AUTO: 4.81 10*6/MM3 (ref 4.14–5.8)
SODIUM SERPL-SCNC: 145 MMOL/L (ref 136–145)
WBC NRBC COR # BLD AUTO: 10.09 10*3/MM3 (ref 3.4–10.8)

## 2024-10-07 PROCEDURE — 82043 UR ALBUMIN QUANTITATIVE: CPT | Performed by: INTERNAL MEDICINE

## 2024-10-07 PROCEDURE — 85025 COMPLETE CBC W/AUTO DIFF WBC: CPT | Performed by: INTERNAL MEDICINE

## 2024-10-07 PROCEDURE — 36415 COLL VENOUS BLD VENIPUNCTURE: CPT | Performed by: INTERNAL MEDICINE

## 2024-10-07 PROCEDURE — 80053 COMPREHEN METABOLIC PANEL: CPT | Performed by: INTERNAL MEDICINE

## 2024-10-07 PROCEDURE — 83036 HEMOGLOBIN GLYCOSYLATED A1C: CPT | Performed by: INTERNAL MEDICINE

## 2024-10-14 ENCOUNTER — OFFICE VISIT (OUTPATIENT)
Dept: INTERNAL MEDICINE | Age: 79
End: 2024-10-14
Payer: MEDICARE

## 2024-10-14 VITALS
SYSTOLIC BLOOD PRESSURE: 138 MMHG | OXYGEN SATURATION: 94 % | WEIGHT: 185 LBS | BODY MASS INDEX: 25.06 KG/M2 | TEMPERATURE: 98.2 F | HEART RATE: 63 BPM | DIASTOLIC BLOOD PRESSURE: 71 MMHG | HEIGHT: 72 IN

## 2024-10-14 DIAGNOSIS — Z12.5 SCREENING PSA (PROSTATE SPECIFIC ANTIGEN): ICD-10-CM

## 2024-10-14 DIAGNOSIS — E11.9 TYPE 2 DIABETES MELLITUS WITHOUT COMPLICATION, WITHOUT LONG-TERM CURRENT USE OF INSULIN: ICD-10-CM

## 2024-10-14 DIAGNOSIS — E55.9 VITAMIN D DEFICIENCY: ICD-10-CM

## 2024-10-14 DIAGNOSIS — E78.2 MIXED HYPERLIPIDEMIA: ICD-10-CM

## 2024-10-14 DIAGNOSIS — I10 ESSENTIAL HYPERTENSION: Primary | ICD-10-CM

## 2024-10-14 DIAGNOSIS — N18.31 STAGE 3A CHRONIC KIDNEY DISEASE: ICD-10-CM

## 2024-10-14 PROCEDURE — 1126F AMNT PAIN NOTED NONE PRSNT: CPT | Performed by: INTERNAL MEDICINE

## 2024-10-14 PROCEDURE — 3075F SYST BP GE 130 - 139MM HG: CPT | Performed by: INTERNAL MEDICINE

## 2024-10-14 PROCEDURE — 99214 OFFICE O/P EST MOD 30 MIN: CPT | Performed by: INTERNAL MEDICINE

## 2024-10-14 PROCEDURE — 3078F DIAST BP <80 MM HG: CPT | Performed by: INTERNAL MEDICINE

## 2024-10-14 RX ORDER — METFORMIN HCL 500 MG
500 TABLET, EXTENDED RELEASE 24 HR ORAL
Qty: 180 TABLET | Refills: 3 | Status: SHIPPED | OUTPATIENT
Start: 2024-10-14

## 2024-10-14 RX ORDER — LANOLIN ALCOHOL/MO/W.PET/CERES
1000 CREAM (GRAM) TOPICAL DAILY
COMMUNITY

## 2024-10-14 NOTE — PROGRESS NOTES
"CHIEF COMPLAINT/ HPI:  Hypertension (Routine follow up. Lab follow up. Pt states no concerns today. Pt decline flu shot.)              Objective   Vital Signs  Vitals:    10/14/24 0951   BP: 138/71   BP Location: Right arm   Patient Position: Sitting   Pulse: 63   Temp: 98.2 °F (36.8 °C)   SpO2: 94%   Weight: 83.9 kg (185 lb)   Height: 182.9 cm (72.01\")      Body mass index is 25.08 kg/m².  Review of Systems   Constitutional: Negative.    HENT: Negative.     Eyes: Negative.    Respiratory: Negative.     Cardiovascular: Negative.    Gastrointestinal: Negative.    Endocrine: Negative.    Genitourinary: Negative.    Musculoskeletal: Negative.    Allergic/Immunologic: Negative.    Neurological: Negative.    Hematological: Negative.    Psychiatric/Behavioral: Negative.        Physical Exam  Constitutional:       General: He is not in acute distress.     Appearance: Normal appearance.   HENT:      Head: Normocephalic.      Mouth/Throat:      Mouth: Mucous membranes are moist.   Eyes:      Conjunctiva/sclera: Conjunctivae normal.      Pupils: Pupils are equal, round, and reactive to light.   Cardiovascular:      Rate and Rhythm: Normal rate and regular rhythm.      Pulses: Normal pulses.      Heart sounds: Normal heart sounds.   Pulmonary:      Effort: Pulmonary effort is normal.      Breath sounds: Normal breath sounds.   Abdominal:      General: Abdomen is flat. Bowel sounds are normal.      Palpations: Abdomen is soft.   Musculoskeletal:         General: No swelling. Normal range of motion.      Cervical back: Neck supple.   Skin:     General: Skin is warm and dry.      Coloration: Skin is not jaundiced.   Neurological:      General: No focal deficit present.      Mental Status: He is alert and oriented to person, place, and time. Mental status is at baseline.   Psychiatric:         Mood and Affect: Mood normal.         Behavior: Behavior normal.         Thought Content: Thought content normal.         Judgment: " "Judgment normal.        Result Review :   No results found for: \"PROBNP\", \"BNP\"  CMP          5/29/2024    08:34 10/7/2024    08:35   CMP   Glucose 110  116    BUN 26  23    Creatinine 1.58  1.38    EGFR 44.5  52.3    Sodium 143  145    Potassium 3.1  3.4    Chloride 103  104    Calcium 9.6  10.0    Total Protein 6.7  7.0    Albumin 4.2  4.1    Globulin 2.5  2.9    Total Bilirubin 0.5  0.6    Alkaline Phosphatase 36  37    AST (SGOT) 22  28    ALT (SGPT) 18  19    Albumin/Globulin Ratio 1.7  1.4    BUN/Creatinine Ratio 16.5  16.7    Anion Gap 13.8  14.7      CBC w/diff          5/29/2024    08:34 10/7/2024    08:35   CBC w/Diff   WBC 8.29  10.09    RBC 4.91  4.81    Hemoglobin 14.4  14.4    Hematocrit 44.6  44.0    MCV 90.8  91.5    MCH 29.3  29.9    MCHC 32.3  32.7    RDW 13.4  12.9    Platelets 209  225    Neutrophil Rel % 54.6  47.4    Immature Granulocyte Rel % 0.1  0.2    Lymphocyte Rel % 34.5  41.1    Monocyte Rel % 7.2  7.4    Eosinophil Rel % 3.0  3.2    Basophil Rel % 0.6  0.7       Lipid Panel          5/29/2024    08:34   Lipid Panel   Total Cholesterol 136    Triglycerides 187    HDL Cholesterol 37    VLDL Cholesterol 31    LDL Cholesterol  68    LDL/HDL Ratio 1.66       Lab Results   Component Value Date    TSH 4.170 05/29/2024    TSH 4.080 01/23/2023    TSH 1.740 12/13/2021      Lab Results   Component Value Date    FREET4 0.97 05/29/2024      A1C Last 3 Results          5/29/2024    08:34 10/7/2024    08:35   HGBA1C Last 3 Results   Hemoglobin A1C 7.10  6.80       PSA          5/29/2024    08:34   PSA   PSA 1.480                     Visit Diagnoses:    ICD-10-CM ICD-9-CM   1. Essential hypertension  I10 401.9   2. Screening PSA (prostate specific antigen)  Z12.5 V76.44   3. Mixed hyperlipidemia  E78.2 272.2   4. Vitamin D deficiency  E55.9 268.9   5. Stage 3a chronic kidney disease  N18.31 585.3   6. Type 2 diabetes mellitus without complication, without long-term current use of insulin  E11.9 250.00 "       Assessment and Plan   Diagnoses and all orders for this visit:    1. Essential hypertension (Primary)  -     Vitamin D,25-Hydroxy; Future  -     Vitamin B12 anemia; Future  -     Folate anemia; Future  -     Lipid Panel; Future  -     Hemoglobin A1c; Future  -     Comprehensive Metabolic Panel; Future  -     CBC & Differential; Future  -     PSA Screen; Future    2. Screening PSA (prostate specific antigen)  -     Vitamin D,25-Hydroxy; Future  -     Vitamin B12 anemia; Future  -     Folate anemia; Future  -     Lipid Panel; Future  -     Hemoglobin A1c; Future  -     Comprehensive Metabolic Panel; Future  -     CBC & Differential; Future  -     PSA Screen; Future    3. Mixed hyperlipidemia  -     Vitamin D,25-Hydroxy; Future  -     Vitamin B12 anemia; Future  -     Folate anemia; Future  -     Lipid Panel; Future  -     Hemoglobin A1c; Future  -     Comprehensive Metabolic Panel; Future  -     CBC & Differential; Future  -     PSA Screen; Future    4. Vitamin D deficiency  -     Vitamin D,25-Hydroxy; Future  -     Vitamin B12 anemia; Future  -     Folate anemia; Future  -     Lipid Panel; Future  -     Hemoglobin A1c; Future  -     Comprehensive Metabolic Panel; Future  -     CBC & Differential; Future  -     PSA Screen; Future    5. Stage 3a chronic kidney disease  -     Vitamin D,25-Hydroxy; Future  -     Vitamin B12 anemia; Future  -     Folate anemia; Future  -     Lipid Panel; Future  -     Hemoglobin A1c; Future  -     Comprehensive Metabolic Panel; Future  -     CBC & Differential; Future  -     PSA Screen; Future    6. Type 2 diabetes mellitus without complication, without long-term current use of insulin  -     Vitamin D,25-Hydroxy; Future  -     Vitamin B12 anemia; Future  -     Folate anemia; Future  -     Lipid Panel; Future  -     Hemoglobin A1c; Future  -     Comprehensive Metabolic Panel; Future  -     CBC & Differential; Future  -     PSA Screen; Future    Other orders  -     metFORMIN ER  (GLUCOPHAGE-XR) 500 MG 24 hr tablet; Take 1 tablet by mouth 2 (Two) Times a Day.  Dispense: 180 tablet; Refill: 3        Annual wellness visit questionnaire completed June 4, 2024    Gout, patient had an allergic reaction to allopurinol, discussed treatment with Medrol Dosepak and/or colchicine if flares up again,    Hypertension, continues  Lotrel 10-20 mg daily, Maxide 25 mg daily,    Hyperlipidemia continues simvastatin 20 mg daily    Diabetes type 2, hemoglobin A1c 0.8 October 7, 2024--, continues on metformin extended release, 500 mg daily--- will increase dose to twice daily dosing October 2024 consider adding Jardiance, Farxiga but both are over priced over $5-$600 /month on his medical plan as of October 2024,    CKD stage IIIa creatinine has been stable over the past 4 to 5 years at 1.2-1.4, will follow    PSA 1.4 May 29, 2024      Follow Up   Return in about 6 months (around 4/14/2025).  Patient was given instructions and counseling regarding his condition or for health maintenance advice. Please see specific information pulled into the AVS if appropriate.

## 2025-04-09 ENCOUNTER — CLINICAL SUPPORT (OUTPATIENT)
Dept: INTERNAL MEDICINE | Age: 80
End: 2025-04-09
Payer: MEDICARE

## 2025-04-09 DIAGNOSIS — Z12.5 SCREENING PSA (PROSTATE SPECIFIC ANTIGEN): ICD-10-CM

## 2025-04-09 DIAGNOSIS — E11.9 TYPE 2 DIABETES MELLITUS WITHOUT COMPLICATION, WITHOUT LONG-TERM CURRENT USE OF INSULIN: ICD-10-CM

## 2025-04-09 DIAGNOSIS — N18.31 STAGE 3A CHRONIC KIDNEY DISEASE: ICD-10-CM

## 2025-04-09 DIAGNOSIS — E55.9 VITAMIN D DEFICIENCY: ICD-10-CM

## 2025-04-09 DIAGNOSIS — E78.2 MIXED HYPERLIPIDEMIA: ICD-10-CM

## 2025-04-09 DIAGNOSIS — I10 ESSENTIAL HYPERTENSION: ICD-10-CM

## 2025-04-09 LAB
25(OH)D3 SERPL-MCNC: 41 NG/ML (ref 30–100)
ALBUMIN SERPL-MCNC: 4 G/DL (ref 3.5–5.2)
ALBUMIN/GLOB SERPL: 1.4 G/DL
ALP SERPL-CCNC: 36 U/L (ref 39–117)
ALT SERPL W P-5'-P-CCNC: 19 U/L (ref 1–41)
ANION GAP SERPL CALCULATED.3IONS-SCNC: 14.4 MMOL/L (ref 5–15)
AST SERPL-CCNC: 29 U/L (ref 1–40)
BASOPHILS # BLD AUTO: 0.03 10*3/MM3 (ref 0–0.2)
BASOPHILS NFR BLD AUTO: 0.4 % (ref 0–1.5)
BILIRUB SERPL-MCNC: 0.6 MG/DL (ref 0–1.2)
BUN SERPL-MCNC: 27 MG/DL (ref 8–23)
BUN/CREAT SERPL: 16.5 (ref 7–25)
CALCIUM SPEC-SCNC: 9.4 MG/DL (ref 8.6–10.5)
CHLORIDE SERPL-SCNC: 102 MMOL/L (ref 98–107)
CHOLEST SERPL-MCNC: 143 MG/DL (ref 0–200)
CO2 SERPL-SCNC: 25.6 MMOL/L (ref 22–29)
CREAT SERPL-MCNC: 1.64 MG/DL (ref 0.76–1.27)
DEPRECATED RDW RBC AUTO: 42.2 FL (ref 37–54)
EGFRCR SERPLBLD CKD-EPI 2021: 42.3 ML/MIN/1.73
EOSINOPHIL # BLD AUTO: 0.36 10*3/MM3 (ref 0–0.4)
EOSINOPHIL NFR BLD AUTO: 4.3 % (ref 0.3–6.2)
ERYTHROCYTE [DISTWIDTH] IN BLOOD BY AUTOMATED COUNT: 12.8 % (ref 12.3–15.4)
FOLATE SERPL-MCNC: 12.1 NG/ML (ref 4.78–24.2)
GLOBULIN UR ELPH-MCNC: 2.8 GM/DL
GLUCOSE SERPL-MCNC: 111 MG/DL (ref 65–99)
HBA1C MFR BLD: 7 % (ref 4.8–5.6)
HCT VFR BLD AUTO: 44.2 % (ref 37.5–51)
HDLC SERPL-MCNC: 40 MG/DL (ref 40–60)
HGB BLD-MCNC: 14.4 G/DL (ref 13–17.7)
IMM GRANULOCYTES # BLD AUTO: 0.02 10*3/MM3 (ref 0–0.05)
IMM GRANULOCYTES NFR BLD AUTO: 0.2 % (ref 0–0.5)
LDLC SERPL CALC-MCNC: 73 MG/DL (ref 0–100)
LDLC/HDLC SERPL: 1.69 {RATIO}
LYMPHOCYTES # BLD AUTO: 3.21 10*3/MM3 (ref 0.7–3.1)
LYMPHOCYTES NFR BLD AUTO: 38.8 % (ref 19.6–45.3)
MCH RBC QN AUTO: 29.8 PG (ref 26.6–33)
MCHC RBC AUTO-ENTMCNC: 32.6 G/DL (ref 31.5–35.7)
MCV RBC AUTO: 91.3 FL (ref 79–97)
MONOCYTES # BLD AUTO: 0.6 10*3/MM3 (ref 0.1–0.9)
MONOCYTES NFR BLD AUTO: 7.2 % (ref 5–12)
NEUTROPHILS NFR BLD AUTO: 4.06 10*3/MM3 (ref 1.7–7)
NEUTROPHILS NFR BLD AUTO: 49.1 % (ref 42.7–76)
NRBC BLD AUTO-RTO: 0 /100 WBC (ref 0–0.2)
PLATELET # BLD AUTO: 239 10*3/MM3 (ref 140–450)
PMV BLD AUTO: 12.6 FL (ref 6–12)
POTASSIUM SERPL-SCNC: 3.3 MMOL/L (ref 3.5–5.2)
PROT SERPL-MCNC: 6.8 G/DL (ref 6–8.5)
RBC # BLD AUTO: 4.84 10*6/MM3 (ref 4.14–5.8)
SODIUM SERPL-SCNC: 142 MMOL/L (ref 136–145)
TRIGL SERPL-MCNC: 178 MG/DL (ref 0–150)
VIT B12 BLD-MCNC: 872 PG/ML (ref 211–946)
VLDLC SERPL-MCNC: 30 MG/DL (ref 5–40)
WBC NRBC COR # BLD AUTO: 8.28 10*3/MM3 (ref 3.4–10.8)

## 2025-04-09 PROCEDURE — 82607 VITAMIN B-12: CPT | Performed by: INTERNAL MEDICINE

## 2025-04-09 PROCEDURE — 80061 LIPID PANEL: CPT | Performed by: INTERNAL MEDICINE

## 2025-04-09 PROCEDURE — 36415 COLL VENOUS BLD VENIPUNCTURE: CPT | Performed by: INTERNAL MEDICINE

## 2025-04-09 PROCEDURE — 85025 COMPLETE CBC W/AUTO DIFF WBC: CPT | Performed by: INTERNAL MEDICINE

## 2025-04-09 PROCEDURE — 83036 HEMOGLOBIN GLYCOSYLATED A1C: CPT | Performed by: INTERNAL MEDICINE

## 2025-04-09 PROCEDURE — 80053 COMPREHEN METABOLIC PANEL: CPT | Performed by: INTERNAL MEDICINE

## 2025-04-09 PROCEDURE — 82746 ASSAY OF FOLIC ACID SERUM: CPT | Performed by: INTERNAL MEDICINE

## 2025-04-09 PROCEDURE — 82306 VITAMIN D 25 HYDROXY: CPT | Performed by: INTERNAL MEDICINE

## 2025-04-16 ENCOUNTER — OFFICE VISIT (OUTPATIENT)
Dept: INTERNAL MEDICINE | Age: 80
End: 2025-04-16
Payer: MEDICARE

## 2025-04-16 VITALS
TEMPERATURE: 98.2 F | OXYGEN SATURATION: 98 % | HEART RATE: 64 BPM | SYSTOLIC BLOOD PRESSURE: 147 MMHG | HEIGHT: 72 IN | WEIGHT: 182 LBS | DIASTOLIC BLOOD PRESSURE: 81 MMHG | BODY MASS INDEX: 24.65 KG/M2

## 2025-04-16 DIAGNOSIS — I10 HYPERTENSION, ESSENTIAL: ICD-10-CM

## 2025-04-16 DIAGNOSIS — E78.2 MIXED HYPERLIPIDEMIA: ICD-10-CM

## 2025-04-16 DIAGNOSIS — N18.31 STAGE 3A CHRONIC KIDNEY DISEASE: ICD-10-CM

## 2025-04-16 DIAGNOSIS — E11.9 TYPE 2 DIABETES MELLITUS WITHOUT COMPLICATION, WITHOUT LONG-TERM CURRENT USE OF INSULIN: Primary | ICD-10-CM

## 2025-04-16 DIAGNOSIS — Z12.5 SCREENING PSA (PROSTATE SPECIFIC ANTIGEN): ICD-10-CM

## 2025-04-16 DIAGNOSIS — E55.9 VITAMIN D DEFICIENCY: ICD-10-CM

## 2025-04-16 NOTE — PROGRESS NOTES
"Chief Complaint   Patient presents with    Hypertension     Routine follow up, Lab follow up.        Objective   Vital Signs  Vitals:    04/16/25 0943   BP: 147/81   BP Location: Right arm   Patient Position: Sitting   Pulse: 64   Temp: 98.2 °F (36.8 °C)   SpO2: 98%   Weight: 82.6 kg (182 lb)   Height: 182.9 cm (72.01\")      Body mass index is 24.68 kg/m².  Review of Systems   Constitutional: Negative.    HENT: Negative.     Eyes: Negative.    Respiratory: Negative.     Cardiovascular: Negative.    Gastrointestinal: Negative.    Endocrine: Negative.    Genitourinary: Negative.    Musculoskeletal: Negative.    Allergic/Immunologic: Negative.    Neurological: Negative.    Hematological: Negative.    Psychiatric/Behavioral: Negative.        Physical Exam  Constitutional:       General: He is not in acute distress.     Appearance: Normal appearance.   HENT:      Head: Normocephalic.      Mouth/Throat:      Mouth: Mucous membranes are moist.   Eyes:      Conjunctiva/sclera: Conjunctivae normal.      Pupils: Pupils are equal, round, and reactive to light.   Cardiovascular:      Rate and Rhythm: Normal rate and regular rhythm.      Pulses: Normal pulses.      Heart sounds: Normal heart sounds.   Pulmonary:      Effort: Pulmonary effort is normal.      Breath sounds: Normal breath sounds.   Abdominal:      General: Abdomen is flat. Bowel sounds are normal.      Palpations: Abdomen is soft.   Musculoskeletal:         General: No swelling. Normal range of motion.      Cervical back: Neck supple.   Skin:     General: Skin is warm and dry.      Coloration: Skin is not jaundiced.   Neurological:      General: No focal deficit present.      Mental Status: He is alert and oriented to person, place, and time. Mental status is at baseline.   Psychiatric:         Mood and Affect: Mood normal.         Behavior: Behavior normal.         Thought Content: Thought content normal.         Judgment: Judgment normal.        Result Review : " "  No results found for: \"PROBNP\", \"BNP\"  CMP          5/29/2024    08:34 10/7/2024    08:35 4/9/2025    08:13   CMP   Glucose 110  116  111    BUN 26  23  27    Creatinine 1.58  1.38  1.64    EGFR 44.5  52.3  42.3    Sodium 143  145  142    Potassium 3.1  3.4  3.3    Chloride 103  104  102    Calcium 9.6  10.0  9.4    Total Protein 6.7  7.0  6.8    Albumin 4.2  4.1  4.0    Globulin 2.5  2.9  2.8    Total Bilirubin 0.5  0.6  0.6    Alkaline Phosphatase 36  37  36    AST (SGOT) 22  28  29    ALT (SGPT) 18  19  19    Albumin/Globulin Ratio 1.7  1.4  1.4    BUN/Creatinine Ratio 16.5  16.7  16.5    Anion Gap 13.8  14.7  14.4      CBC w/diff          5/29/2024    08:34 10/7/2024    08:35 4/9/2025    08:13   CBC w/Diff   WBC 8.29  10.09  8.28    RBC 4.91  4.81  4.84    Hemoglobin 14.4  14.4  14.4    Hematocrit 44.6  44.0  44.2    MCV 90.8  91.5  91.3    MCH 29.3  29.9  29.8    MCHC 32.3  32.7  32.6    RDW 13.4  12.9  12.8    Platelets 209  225  239    Neutrophil Rel % 54.6  47.4  49.1    Immature Granulocyte Rel % 0.1  0.2  0.2    Lymphocyte Rel % 34.5  41.1  38.8    Monocyte Rel % 7.2  7.4  7.2    Eosinophil Rel % 3.0  3.2  4.3    Basophil Rel % 0.6  0.7  0.4       Lipid Panel          5/29/2024    08:34 4/9/2025    08:13   Lipid Panel   Total Cholesterol 136  143    Triglycerides 187  178    HDL Cholesterol 37  40    VLDL Cholesterol 31  30    LDL Cholesterol  68  73    LDL/HDL Ratio 1.66  1.69       Lab Results   Component Value Date    TSH 4.170 05/29/2024    TSH 4.080 01/23/2023    TSH 1.740 12/13/2021      Lab Results   Component Value Date    FREET4 0.97 05/29/2024      A1C Last 3 Results          5/29/2024    08:34 10/7/2024    08:35 4/9/2025    08:13   HGBA1C Last 3 Results   Hemoglobin A1C 7.10  6.80  7.00       PSA          5/29/2024    08:34   PSA   PSA 1.480                     Visit Diagnoses:    ICD-10-CM ICD-9-CM   1. Type 2 diabetes mellitus without complication, without long-term current use of insulin  " E11.9 250.00   2. Mixed hyperlipidemia  E78.2 272.2   3. Hypertension, essential  I10 401.9   4. Screening PSA (prostate specific antigen)  Z12.5 V76.44   5. Stage 3a chronic kidney disease  N18.31 585.3   6. Vitamin D deficiency  E55.9 268.9       Assessment and Plan   Diagnoses and all orders for this visit:    1. Type 2 diabetes mellitus without complication, without long-term current use of insulin (Primary)  -     Microalbumin / Creatinine Urine Ratio - Urine, Clean Catch; Future  -     PSA Screen; Future  -     Microalbumin / Creatinine Urine Ratio - Urine, Clean Catch; Future  -     Lipid Panel; Future  -     Hemoglobin A1c; Future  -     Comprehensive Metabolic Panel; Future  -     CBC & Differential; Future    2. Mixed hyperlipidemia  -     Microalbumin / Creatinine Urine Ratio - Urine, Clean Catch; Future  -     PSA Screen; Future  -     Microalbumin / Creatinine Urine Ratio - Urine, Clean Catch; Future  -     Lipid Panel; Future  -     Hemoglobin A1c; Future  -     Comprehensive Metabolic Panel; Future  -     CBC & Differential; Future    3. Hypertension, essential  -     Microalbumin / Creatinine Urine Ratio - Urine, Clean Catch; Future  -     PSA Screen; Future  -     Microalbumin / Creatinine Urine Ratio - Urine, Clean Catch; Future  -     Lipid Panel; Future  -     Hemoglobin A1c; Future  -     Comprehensive Metabolic Panel; Future  -     CBC & Differential; Future    4. Screening PSA (prostate specific antigen)  -     Microalbumin / Creatinine Urine Ratio - Urine, Clean Catch; Future  -     PSA Screen; Future  -     Microalbumin / Creatinine Urine Ratio - Urine, Clean Catch; Future  -     Lipid Panel; Future  -     Hemoglobin A1c; Future  -     Comprehensive Metabolic Panel; Future  -     CBC & Differential; Future    5. Stage 3a chronic kidney disease  -     Microalbumin / Creatinine Urine Ratio - Urine, Clean Catch; Future  -     PSA Screen; Future  -     Microalbumin / Creatinine Urine Ratio -  Urine, Clean Catch; Future  -     Lipid Panel; Future  -     Hemoglobin A1c; Future  -     Comprehensive Metabolic Panel; Future  -     CBC & Differential; Future    6. Vitamin D deficiency  -     Microalbumin / Creatinine Urine Ratio - Urine, Clean Catch; Future  -     PSA Screen; Future  -     Microalbumin / Creatinine Urine Ratio - Urine, Clean Catch; Future  -     Lipid Panel; Future  -     Hemoglobin A1c; Future  -     Comprehensive Metabolic Panel; Future  -     CBC & Differential; Future    Other orders  -     EYE EXAM SCANNED    Annual wellness visit questionnaire completed June 4, 2024    Gout, patient had an allergic reaction to allopurinol, discussed treatment with Medrol Dosepak and/or colchicine if flares up again,    Hypertension, continues  Lotrel 10-20 mg daily, Maxide 25 mg daily,    Hypokalemia --will increase over-the-counter potassium supplements to twice daily April 16, 2025    Hyperlipidemia continues simvastatin 20 mg daily    Diabetes type 2, hemoglobin A1c 7.0----April 2025---, continues on metformin extended release, 500 mg bid-- , Farxiga but both are over priced over $5-$600 /month on his medical plan as of October 2024,    CKD stage IIIa     PSA 1.4 May 29, 2024          Follow Up   Return in about 6 months (around 10/16/2025).  Patient was given instructions and counseling regarding his condition or for health maintenance advice. Please see specific information pulled into the AVS if appropriate.

## 2025-06-06 DIAGNOSIS — E78.2 MIXED HYPERLIPIDEMIA: ICD-10-CM

## 2025-06-06 DIAGNOSIS — I10 ESSENTIAL HYPERTENSION: ICD-10-CM

## 2025-06-06 RX ORDER — SIMVASTATIN 20 MG
20 TABLET ORAL DAILY
Qty: 90 TABLET | Refills: 3 | Status: SHIPPED | OUTPATIENT
Start: 2025-06-06

## 2025-06-06 RX ORDER — TRIAMTERENE AND HYDROCHLOROTHIAZIDE 37.5; 25 MG/1; MG/1
1 TABLET ORAL DAILY
Qty: 90 TABLET | Refills: 3 | Status: SHIPPED | OUTPATIENT
Start: 2025-06-06

## 2025-06-06 RX ORDER — AMLODIPINE AND BENAZEPRIL HYDROCHLORIDE 10; 20 MG/1; MG/1
1 CAPSULE ORAL DAILY
Qty: 90 CAPSULE | Refills: 3 | Status: SHIPPED | OUTPATIENT
Start: 2025-06-06